# Patient Record
Sex: FEMALE | Race: WHITE | Employment: OTHER | URBAN - METROPOLITAN AREA
[De-identification: names, ages, dates, MRNs, and addresses within clinical notes are randomized per-mention and may not be internally consistent; named-entity substitution may affect disease eponyms.]

---

## 2017-06-02 ENCOUNTER — HOSPITAL ENCOUNTER (EMERGENCY)
Age: 82
Discharge: HOME | End: 2017-06-02
Attending: EMERGENCY MEDICINE
Payer: MEDICARE

## 2017-06-02 VITALS
TEMPERATURE: 96 F | BODY MASS INDEX: 16.1 KG/M2 | HEART RATE: 103 BPM | DIASTOLIC BLOOD PRESSURE: 72 MMHG | RESPIRATION RATE: 18 BRPM | HEIGHT: 60 IN | SYSTOLIC BLOOD PRESSURE: 117 MMHG | WEIGHT: 82 LBS | OXYGEN SATURATION: 96 %

## 2017-06-02 DIAGNOSIS — S91.051A CAT BITE OF ANKLE, RIGHT, INITIAL ENCOUNTER: Primary | ICD-10-CM
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
## 2017-06-02 PROCEDURE — 90471 IMMUNIZATION ADMIN: CPT | Performed by: EMERGENCY MEDICINE

## 2017-06-02 PROCEDURE — 6370000000 HC RX 637 (ALT 250 FOR IP): Performed by: EMERGENCY MEDICINE

## 2017-06-02 PROCEDURE — 90715 TDAP VACCINE 7 YRS/> IM: CPT | Performed by: EMERGENCY MEDICINE

## 2017-06-02 PROCEDURE — 99283 EMERGENCY DEPT VISIT LOW MDM: CPT

## 2017-06-02 PROCEDURE — 6360000002 HC RX W HCPCS: Performed by: EMERGENCY MEDICINE

## 2017-06-02 RX ORDER — BACITRACIN, NEOMYCIN, POLYMYXIN B 400; 3.5; 5 [USP'U]/G; MG/G; [USP'U]/G
OINTMENT TOPICAL
Qty: 10 G | Refills: 0 | Status: SHIPPED | OUTPATIENT
Start: 2017-06-02 | End: 2017-06-12

## 2017-06-02 RX ORDER — ONDANSETRON 4 MG/1
4 TABLET, FILM COATED ORAL EVERY 8 HOURS PRN
Qty: 20 TABLET | Refills: 0 | Status: ON HOLD | OUTPATIENT
Start: 2017-06-02 | End: 2017-10-10 | Stop reason: ALTCHOICE

## 2017-06-02 RX ORDER — CEPHALEXIN 500 MG/1
500 CAPSULE ORAL 3 TIMES DAILY
Qty: 21 CAPSULE | Refills: 0 | Status: ON HOLD | OUTPATIENT
Start: 2017-06-02 | End: 2017-10-13 | Stop reason: HOSPADM

## 2017-06-02 RX ORDER — AMOXICILLIN AND CLAVULANATE POTASSIUM 500; 125 MG/1; MG/1
1 TABLET, FILM COATED ORAL 3 TIMES DAILY
Qty: 30 TABLET | Refills: 0 | Status: SHIPPED | OUTPATIENT
Start: 2017-06-02 | End: 2017-06-02 | Stop reason: SDDI

## 2017-06-02 RX ORDER — AMOXICILLIN AND CLAVULANATE POTASSIUM 500; 125 MG/1; MG/1
1 TABLET, FILM COATED ORAL EVERY 8 HOURS SCHEDULED
Status: DISCONTINUED | OUTPATIENT
Start: 2017-06-02 | End: 2017-06-02 | Stop reason: HOSPADM

## 2017-06-02 RX ORDER — AMOXICILLIN AND CLAVULANATE POTASSIUM 250; 62.5 MG/5ML; MG/5ML
500 POWDER, FOR SUSPENSION ORAL ONCE
Status: DISCONTINUED | OUTPATIENT
Start: 2017-06-02 | End: 2017-06-02

## 2017-06-02 RX ADMIN — AMOXICILLIN AND CLAVULANATE POTASSIUM 1 TABLET: 500; 125 TABLET, FILM COATED ORAL at 12:49

## 2017-06-02 RX ADMIN — TETANUS TOXOID, REDUCED DIPHTHERIA TOXOID AND ACELLULAR PERTUSSIS VACCINE, ADSORBED 0.5 ML: 5; 2.5; 8; 8; 2.5 SUSPENSION INTRAMUSCULAR at 12:26

## 2017-06-02 ASSESSMENT — ENCOUNTER SYMPTOMS
ALLERGIC/IMMUNOLOGIC NEGATIVE: 1
RESPIRATORY NEGATIVE: 1
GASTROINTESTINAL NEGATIVE: 1
EYES NEGATIVE: 1

## 2017-06-02 ASSESSMENT — PAIN DESCRIPTION - DESCRIPTORS: DESCRIPTORS: TENDER

## 2017-06-02 ASSESSMENT — PAIN SCALES - GENERAL: PAINLEVEL_OUTOF10: 4

## 2017-06-02 ASSESSMENT — PAIN DESCRIPTION - LOCATION: LOCATION: ANKLE

## 2017-06-02 ASSESSMENT — PAIN DESCRIPTION - ORIENTATION: ORIENTATION: RIGHT

## 2017-06-02 ASSESSMENT — PAIN DESCRIPTION - PAIN TYPE: TYPE: ACUTE PAIN

## 2017-10-08 ENCOUNTER — APPOINTMENT (OUTPATIENT)
Dept: GENERAL RADIOLOGY | Age: 82
DRG: 189 | End: 2017-10-08
Payer: MEDICARE

## 2017-10-08 ENCOUNTER — HOSPITAL ENCOUNTER (INPATIENT)
Age: 82
LOS: 5 days | Discharge: NURSING, REHAB OR LONG-TERM CARE | DRG: 189 | End: 2017-10-13
Attending: EMERGENCY MEDICINE | Admitting: INTERNAL MEDICINE
Payer: MEDICARE

## 2017-10-08 ENCOUNTER — APPOINTMENT (OUTPATIENT)
Dept: CT IMAGING | Age: 82
DRG: 189 | End: 2017-10-08
Payer: MEDICARE

## 2017-10-08 DIAGNOSIS — R79.89 LFT ELEVATION: ICD-10-CM

## 2017-10-08 DIAGNOSIS — R74.8 CARDIAC ENZYMES ELEVATED: ICD-10-CM

## 2017-10-08 DIAGNOSIS — R55 SYNCOPE AND COLLAPSE: Primary | ICD-10-CM

## 2017-10-08 DIAGNOSIS — R41.0 DISORIENTATION: ICD-10-CM

## 2017-10-08 DIAGNOSIS — E87.20 LACTIC ACID INCREASED: ICD-10-CM

## 2017-10-08 PROBLEM — R06.89 CO2 NARCOSIS: Status: ACTIVE | Noted: 2017-10-08

## 2017-10-08 PROBLEM — J44.1 CHRONIC OBSTRUCTIVE PULMONARY DISEASE WITH ACUTE EXACERBATION (HCC): Status: ACTIVE | Noted: 2017-10-08

## 2017-10-08 PROBLEM — R77.8 ELEVATED TROPONIN: Status: ACTIVE | Noted: 2017-10-08

## 2017-10-08 PROBLEM — I95.9 HYPOTENSION: Status: ACTIVE | Noted: 2017-10-08

## 2017-10-08 PROBLEM — J96.22 ACUTE ON CHRONIC RESPIRATORY FAILURE WITH HYPERCAPNIA (HCC): Status: ACTIVE | Noted: 2017-10-08

## 2017-10-08 LAB
% CKMB: 12.2 % (ref 0–3)
% CKMB: 8.2 % (ref 0–3)
-: ABNORMAL
ABSOLUTE EOS #: 0 K/UL (ref 0–0.4)
ABSOLUTE LYMPH #: 0.5 K/UL (ref 1–4.8)
ABSOLUTE MONO #: 0.3 K/UL (ref 0.2–0.8)
ALBUMIN SERPL-MCNC: 3.3 G/DL (ref 3.5–5.2)
ALBUMIN/GLOBULIN RATIO: ABNORMAL (ref 1–2.5)
ALP BLD-CCNC: 92 U/L (ref 35–104)
ALT SERPL-CCNC: 503 U/L (ref 5–33)
AMMONIA: 29 UMOL/L (ref 11–51)
AMORPHOUS: ABNORMAL
AMYLASE: 90 U/L (ref 28–100)
ANION GAP SERPL CALCULATED.3IONS-SCNC: 15 MMOL/L (ref 9–17)
AST SERPL-CCNC: 549 U/L
BACTERIA: ABNORMAL
BASOPHILS # BLD: 0 %
BASOPHILS ABSOLUTE: 0 K/UL (ref 0–0.2)
BILIRUB SERPL-MCNC: 0.74 MG/DL (ref 0.3–1.2)
BILIRUBIN URINE: NEGATIVE
BNP INTERPRETATION: ABNORMAL
BUN BLDV-MCNC: 32 MG/DL (ref 8–23)
BUN/CREAT BLD: 21 (ref 9–20)
CALCIUM SERPL-MCNC: 9 MG/DL (ref 8.6–10.4)
CASTS UA: ABNORMAL /LPF
CHLORIDE BLD-SCNC: 100 MMOL/L (ref 98–107)
CK MB: 12.6 NG/ML
CK MB: 5 NG/ML
CKMB INTERPRETATION: ABNORMAL
CKMB INTERPRETATION: ABNORMAL
CO2: 28 MMOL/L (ref 20–31)
COLOR: YELLOW
COMMENT UA: ABNORMAL
CREAT SERPL-MCNC: 1.51 MG/DL (ref 0.5–0.9)
CRYSTALS, UA: ABNORMAL /HPF
DIFFERENTIAL TYPE: ABNORMAL
EOSINOPHILS RELATIVE PERCENT: 0 %
EPITHELIAL CELLS UA: ABNORMAL /HPF
FIO2: 2
GFR AFRICAN AMERICAN: 39 ML/MIN
GFR NON-AFRICAN AMERICAN: 32 ML/MIN
GFR SERPL CREATININE-BSD FRML MDRD: ABNORMAL ML/MIN/{1.73_M2}
GFR SERPL CREATININE-BSD FRML MDRD: ABNORMAL ML/MIN/{1.73_M2}
GLUCOSE BLD-MCNC: 166 MG/DL (ref 70–99)
GLUCOSE URINE: NEGATIVE
HCT VFR BLD CALC: 43.5 % (ref 36–46)
HEMOGLOBIN: 14.1 G/DL (ref 12–16)
KETONES, URINE: NEGATIVE
LACTIC ACID: 2.5 MMOL/L (ref 0.5–2.2)
LACTIC ACID: 2.9 MMOL/L (ref 0.5–2.2)
LEUKOCYTE ESTERASE, URINE: NEGATIVE
LIPASE: 58 U/L (ref 13–60)
LYMPHOCYTES # BLD: 4 %
MAGNESIUM: 2.7 MG/DL (ref 1.6–2.6)
MCH RBC QN AUTO: 30.7 PG (ref 26–34)
MCHC RBC AUTO-ENTMCNC: 32.4 G/DL (ref 31–37)
MCV RBC AUTO: 94.6 FL (ref 80–100)
MONOCYTES # BLD: 2 %
MUCUS: ABNORMAL
MYOGLOBIN: 278 NG/ML (ref 25–58)
MYOGLOBIN: 320 NG/ML (ref 25–58)
NEGATIVE BASE EXCESS, ART: ABNORMAL (ref 0–2)
NITRITE, URINE: NEGATIVE
O2 DEVICE/FLOW/%: ABNORMAL
OTHER OBSERVATIONS UA: ABNORMAL
PATIENT TEMP: ABNORMAL
PDW BLD-RTO: 14.1 % (ref 11.5–14.5)
PH UA: 6 (ref 5–8)
PLATELET # BLD: 227 K/UL (ref 130–400)
PLATELET ESTIMATE: ABNORMAL
PMV BLD AUTO: ABNORMAL FL (ref 6–12)
POC HCO3: 39 MMOL/L (ref 22–27)
POC O2 SATURATION: 98 %
POC PCO2 TEMP: ABNORMAL MM HG
POC PCO2: 94 MM HG (ref 32–45)
POC PH TEMP: ABNORMAL
POC PH: 7.23 (ref 7.35–7.45)
POC PO2 TEMP: ABNORMAL MM HG
POC PO2: 132 MM HG (ref 75–95)
POSITIVE BASE EXCESS, ART: 11 (ref 0–2)
POTASSIUM SERPL-SCNC: 5.2 MMOL/L (ref 3.7–5.3)
PRO-BNP: 1514 PG/ML
PROTEIN UA: ABNORMAL
RBC # BLD: 4.6 M/UL (ref 4–5.2)
RBC # BLD: ABNORMAL 10*6/UL
RBC UA: ABNORMAL /HPF (ref 0–2)
RENAL EPITHELIAL, UA: ABNORMAL /HPF
SEG NEUTROPHILS: 94 %
SEGMENTED NEUTROPHILS ABSOLUTE COUNT: 11.6 K/UL (ref 1.8–7.7)
SODIUM BLD-SCNC: 143 MMOL/L (ref 135–144)
SPECIFIC GRAVITY UA: 1.02 (ref 1–1.03)
TCO2 (CALC), ART: 42 MMOL/L (ref 23–28)
TOTAL CK: 103 U/L (ref 26–192)
TOTAL CK: 61 U/L (ref 26–192)
TOTAL PROTEIN: 6.7 G/DL (ref 6.4–8.3)
TRICHOMONAS: ABNORMAL
TROPONIN INTERP: ABNORMAL
TROPONIN INTERP: ABNORMAL
TROPONIN T: 0.07 NG/ML
TROPONIN T: 0.18 NG/ML
TURBIDITY: CLEAR
URINE HGB: NEGATIVE
UROBILINOGEN, URINE: NORMAL
WBC # BLD: 12.4 K/UL (ref 3.5–11)
WBC # BLD: ABNORMAL 10*3/UL
WBC UA: ABNORMAL /HPF (ref 0–5)
YEAST: ABNORMAL

## 2017-10-08 PROCEDURE — 93005 ELECTROCARDIOGRAM TRACING: CPT

## 2017-10-08 PROCEDURE — 51701 INSERT BLADDER CATHETER: CPT

## 2017-10-08 PROCEDURE — 83690 ASSAY OF LIPASE: CPT

## 2017-10-08 PROCEDURE — 36600 WITHDRAWAL OF ARTERIAL BLOOD: CPT

## 2017-10-08 PROCEDURE — 85025 COMPLETE CBC W/AUTO DIFF WBC: CPT

## 2017-10-08 PROCEDURE — 96361 HYDRATE IV INFUSION ADD-ON: CPT

## 2017-10-08 PROCEDURE — 83735 ASSAY OF MAGNESIUM: CPT

## 2017-10-08 PROCEDURE — 96360 HYDRATION IV INFUSION INIT: CPT

## 2017-10-08 PROCEDURE — 82150 ASSAY OF AMYLASE: CPT

## 2017-10-08 PROCEDURE — 72170 X-RAY EXAM OF PELVIS: CPT

## 2017-10-08 PROCEDURE — 2060000000 HC ICU INTERMEDIATE R&B

## 2017-10-08 PROCEDURE — 83605 ASSAY OF LACTIC ACID: CPT

## 2017-10-08 PROCEDURE — 96374 THER/PROPH/DIAG INJ IV PUSH: CPT

## 2017-10-08 PROCEDURE — 99285 EMERGENCY DEPT VISIT HI MDM: CPT

## 2017-10-08 PROCEDURE — 94150 VITAL CAPACITY TEST: CPT

## 2017-10-08 PROCEDURE — 80053 COMPREHEN METABOLIC PANEL: CPT

## 2017-10-08 PROCEDURE — 84484 ASSAY OF TROPONIN QUANT: CPT

## 2017-10-08 PROCEDURE — 94640 AIRWAY INHALATION TREATMENT: CPT

## 2017-10-08 PROCEDURE — 70450 CT HEAD/BRAIN W/O DYE: CPT

## 2017-10-08 PROCEDURE — 81001 URINALYSIS AUTO W/SCOPE: CPT

## 2017-10-08 PROCEDURE — 83880 ASSAY OF NATRIURETIC PEPTIDE: CPT

## 2017-10-08 PROCEDURE — 82553 CREATINE MB FRACTION: CPT

## 2017-10-08 PROCEDURE — 94761 N-INVAS EAR/PLS OXIMETRY MLT: CPT

## 2017-10-08 PROCEDURE — 2580000003 HC RX 258: Performed by: NURSE PRACTITIONER

## 2017-10-08 PROCEDURE — 6360000002 HC RX W HCPCS: Performed by: NURSE PRACTITIONER

## 2017-10-08 PROCEDURE — 82550 ASSAY OF CK (CPK): CPT

## 2017-10-08 PROCEDURE — 82803 BLOOD GASES ANY COMBINATION: CPT

## 2017-10-08 PROCEDURE — 83874 ASSAY OF MYOGLOBIN: CPT

## 2017-10-08 PROCEDURE — 36415 COLL VENOUS BLD VENIPUNCTURE: CPT

## 2017-10-08 PROCEDURE — 87040 BLOOD CULTURE FOR BACTERIA: CPT

## 2017-10-08 PROCEDURE — 2700000000 HC OXYGEN THERAPY PER DAY

## 2017-10-08 PROCEDURE — 71010 XR CHEST PORTABLE: CPT

## 2017-10-08 PROCEDURE — 72125 CT NECK SPINE W/O DYE: CPT

## 2017-10-08 PROCEDURE — 87086 URINE CULTURE/COLONY COUNT: CPT

## 2017-10-08 PROCEDURE — 82140 ASSAY OF AMMONIA: CPT

## 2017-10-08 PROCEDURE — 74176 CT ABD & PELVIS W/O CONTRAST: CPT

## 2017-10-08 PROCEDURE — 6370000000 HC RX 637 (ALT 250 FOR IP): Performed by: EMERGENCY MEDICINE

## 2017-10-08 RX ORDER — ALBUTEROL SULFATE 2.5 MG/3ML
5 SOLUTION RESPIRATORY (INHALATION)
Status: DISCONTINUED | OUTPATIENT
Start: 2017-10-08 | End: 2017-10-09

## 2017-10-08 RX ORDER — 0.9 % SODIUM CHLORIDE 0.9 %
500 INTRAVENOUS SOLUTION INTRAVENOUS ONCE
Status: COMPLETED | OUTPATIENT
Start: 2017-10-08 | End: 2017-10-08

## 2017-10-08 RX ORDER — ALBUTEROL SULFATE 90 UG/1
2 AEROSOL, METERED RESPIRATORY (INHALATION)
Status: DISCONTINUED | OUTPATIENT
Start: 2017-10-08 | End: 2017-10-09

## 2017-10-08 RX ORDER — ASPIRIN 81 MG/1
324 TABLET, CHEWABLE ORAL ONCE
Status: COMPLETED | OUTPATIENT
Start: 2017-10-08 | End: 2017-10-08

## 2017-10-08 RX ORDER — SODIUM CHLORIDE 9 MG/ML
INJECTION, SOLUTION INTRAVENOUS CONTINUOUS
Status: DISCONTINUED | OUTPATIENT
Start: 2017-10-08 | End: 2017-10-09 | Stop reason: SDUPTHER

## 2017-10-08 RX ORDER — METHYLPREDNISOLONE SODIUM SUCCINATE 125 MG/2ML
125 INJECTION, POWDER, LYOPHILIZED, FOR SOLUTION INTRAMUSCULAR; INTRAVENOUS ONCE
Status: COMPLETED | OUTPATIENT
Start: 2017-10-08 | End: 2017-10-08

## 2017-10-08 RX ORDER — IPRATROPIUM BROMIDE AND ALBUTEROL SULFATE 2.5; .5 MG/3ML; MG/3ML
1 SOLUTION RESPIRATORY (INHALATION)
Status: DISCONTINUED | OUTPATIENT
Start: 2017-10-08 | End: 2017-10-09

## 2017-10-08 RX ORDER — SPIRONOLACTONE 50 MG/1
TABLET, FILM COATED ORAL DAILY
Status: ON HOLD | COMMUNITY
End: 2017-10-10 | Stop reason: ALTCHOICE

## 2017-10-08 RX ADMIN — SODIUM CHLORIDE 500 ML: 0.9 INJECTION, SOLUTION INTRAVENOUS at 19:13

## 2017-10-08 RX ADMIN — ASPIRIN 81 MG 324 MG: 81 TABLET ORAL at 22:38

## 2017-10-08 RX ADMIN — METHYLPREDNISOLONE SODIUM SUCCINATE 125 MG: 125 INJECTION, POWDER, FOR SOLUTION INTRAMUSCULAR; INTRAVENOUS at 19:17

## 2017-10-08 RX ADMIN — ALBUTEROL SULFATE 5 MG: 5 SOLUTION RESPIRATORY (INHALATION) at 18:55

## 2017-10-08 RX ADMIN — SODIUM CHLORIDE: 9 INJECTION, SOLUTION INTRAVENOUS at 20:01

## 2017-10-08 ASSESSMENT — ENCOUNTER SYMPTOMS
COUGH: 0
COLOR CHANGE: 0
SHORTNESS OF BREATH: 0
NAUSEA: 0
ABDOMINAL PAIN: 0
VOMITING: 0

## 2017-10-08 NOTE — ED PROVIDER NOTES
[address] [place] [address] ED  eMERGENCY dEPARTMENT eNCOUnter      Pt Name: Vidal Dong  MRN: [id number]  [place] 11/10/1925  Date of evaluation: 10/8/2017  Provider: Ashutosh Bustillos NP    [address] [place] [address]       Chief Complaint   Patient presents with    Loss of Consciousness         HISTORY OF PRESENT ILLNESS  (Location/Symptom, Timing/Onset, Context/Setting, Quality, Duration, Modifying Factors, Severity.)   Vidal Dong is a 80 y.o. female who presents to the emergency department By [facility] for evaluation of loss of consciousness. Daughter states patient was found lying on May the neuro floor by the patient's spouse. The patient's spouse had walked outside for a little while when he came back patient was found on the floor. She was unconscious. It is unknown how long the patient was down on the floor. When EMS arrived patient was breathing shallow. She was started on IV fluids and was arousable. Upon arrival to the ED, the patient is alert, oriented and appropriate. She denies pain. No nausea or vomiting. She is able to move all extremities to command without difficulty. No focal weakness. Daughter states patient has had poor appetite and has had increased generalized weakness. Nursing Notes were reviewed.     PAST MEDICAL HISTORY     Past Medical History:   Diagnosis Date    CAD (coronary artery disease)     [facility]     Colon polyp     COPD [facility] obstructive [facility]     Hypertension     Hyperthyroidism     Pelvic fracture (HCC)     Unspecified cerebral artery occlusion with cerebral infarction          SURGICAL HISTORY       Past Surgical History:   Procedure Laterality Date    BREAST SURGERY      COLONOSCOPY      CORONARY ANGIOPLASTY WITH STENT PLACEMENT  2010    HYSTERECTOMY      JOINT REPLACEMENT      THYROIDECTOMY N/A     partial     VASCULAR SURGERY           CURRENT MEDICATIONS       Previous Medications    ALBUTEROL (ACCUNEB) 0.63 MG/3ML NEBULIZER SOLUTION    Take 1 ampule Abdominal: Soft. Normal appearance and bowel sounds are normal. There is no tenderness. There is no rigidity, no rebound and no guarding. No hernia. Musculoskeletal: Normal range of motion. Neurological: She is alert and oriented to person, place, and time. She has normal strength and normal reflexes. No cranial nerve deficit or sensory deficit. Skin: Skin is warm, dry and intact. No laceration noted. No erythema. There is slight mottling noted to the patient's lower extremities. Psychiatric: She has a normal mood and affect. Her speech is normal and behavior is normal. Judgment and thought content normal. Cognition and memory are normal.         DIAGNOSTIC RESULTS     EKG: All EKG's are interpreted by the Emergency Department Physician who either signs or Co-signs this chart in the absence of a cardiologist.    EKG interpreted by attending physician    RADIOLOGY:   Non-plain film images such as CT, Ultrasound and MRI are read by the radiologist. Jettie Carwin radiographic images are visualized and preliminarily interpreted by the emergency physician with the below findings:    Ct Abdomen Pelvis Wo Iv Contrast    Result Date: 10/8/2017  EXAMINATION: CT OF THE ABDOMEN AND PELVIS WITHOUT CONTRAST 10/8/2017 8:44 pm TECHNIQUE: CT of the abdomen and pelvis was performed without the administration of intravenous contrast. Multiplanar reformatted images are provided for review. Dose modulation, iterative reconstruction, and/or weight based adjustment of the mA/kV was utilized to reduce the radiation dose to as low as reasonably achievable. COMPARISON: September 26, 2015 HISTORY: ORDERING SYSTEM PROVIDED HISTORY: loc, elev lactic, abd pain FINDINGS: Limited study due to lack of IV contrast and paucity of intra-abdominal fat with crowding of soft tissue and bowel structures. Lower Chest:  Mild bronchiectasis with bronchial wall thickening at the bases, some with mucus impaction.   Additional scattered reticular opacities age-indeterminate mild compression deformity of the T12 vertebral body, though new from September 26, 2015. 8. Chronic compression deformity of the L1 vertebral body with vertebroplasty cement material in place and mild increased retropulsion. 9. No definite pelvic or hip fracture identified. 10. Sigmoid diverticulosis. 11. Trace right pleural effusion. Mild bronchiectasis and bronchial wall thickening in bilateral lower lobes, some with mucus impaction. Findings could represent aspiration or infectious bronchiolitis. Xr Pelvis (1-2 Views)    Result Date: 10/8/2017  EXAMINATION: SINGLE VIEW OF THE PELVIS 10/8/2017 7:40 pm COMPARISON: August 31, 2012 HISTORY: ORDERING SYSTEM PROVIDED HISTORY: portable film, found down TECHNOLOGIST PROVIDED HISTORY: Reason for exam:->portable film, found down Ordering Physician Provided Reason for Exam: syncope Acuity: Acute Type of Exam: Initial FINDINGS: Diffuse osteopenia limits evaluation for subtle fractures. Chronic fracture deformities of bilateral inferior pubic rami. No definite acute fracture identified. Stable appearance of the left proximal femur fixation hardware. Leftward curvature of the lumbar spine with associated degenerative changes noted. No definite acute soft tissue abnormality. Limited study due to diffuse osteopenia. No definite acute fracture or subluxation identified. Chronic fracture deformities of bilateral inferior pubic rami. Ct Head Wo Contrast    Result Date: 10/8/2017  EXAMINATION: CT OF THE HEAD WITHOUT CONTRAST  10/8/2017 7:38 pm TECHNIQUE: CT of the head was performed without the administration of intravenous contrast. Dose modulation, iterative reconstruction, and/or weight based adjustment of the mA/kV was utilized to reduce the radiation dose to as low as reasonably achievable. COMPARISON: September 25, 2015.  HISTORY: ORDERING SYSTEM PROVIDED HISTORY: Fall TECHNOLOGIST PROVIDED HISTORY: Has a \"code stroke\" or \"stroke alert\" flexion-extension views may be helpful. Xr Chest Portable    Result Date: 10/8/2017  EXAMINATION: SINGLE VIEW OF THE CHEST 10/8/2017 7:21 pm COMPARISON: December 22, 2014 HISTORY: ORDERING SYSTEM PROVIDED HISTORY: SOB TECHNOLOGIST PROVIDED HISTORY: Reason for exam:->SOB Ordering Physician Provided Reason for Exam: loss of consciousness Acuity: Unknown Type of Exam: Unknown Relevant Medical/Surgical History: pt was found unconscious, hx of copd FINDINGS: Single portable frontal projection of the chest with obscuration of the left greater than right apices. Emphysema with fibrotic changes again noted, including fibronodular scarring in the right apex. No definite pneumothorax. No sizable pleural effusions. No new or focal lung opacity or consolidation. Calcified granulomas are present. The cardiomediastinal silhouette is unchanged. Surgical clips are noted in the right chest/axillary region. There is diffuse osteopenia. There is vertebroplasty cement material within the L1 vertebral body. No definite significant change compared to prior study. Redemonstration of emphysema with fibrotic changes, including fibronodular scarring in the right apex. No acute consolidation. Interpretation per the Radiologist below, if available at the time of this note:    CT ABDOMEN PELVIS WO IV CONTRAST   Preliminary Result   1. Limited examination due to lack of IV contrast and paucity of   intra-abdominal fat, with crowding of bowel and organs. 2. No definite acute abnormality detected. 3. Nonspecific small fluid within the lower pelvis. 4. Garza catheter in place. 5. Distended stomach, of uncertain etiology or clinical significance. 6. Nonobstructing bilateral punctate renal calculi. 7. Diffuse osteopenia with age-indeterminate mild compression deformity of   the T12 vertebral body, though new from September 26, 2015.    8. Chronic compression deformity of the L1 vertebral body with vertebroplasty (*)     Segs Absolute 11.60 (*)     Absolute Lymph # 0.50 (*)     All other components within normal limits   TROP/MYOGLOBIN - Abnormal; Notable for the following:     Troponin T 0.07 (*)     Myoglobin 320 (*)     All other components within normal limits   TROP/MYOGLOBIN - Abnormal; Notable for the following:     Troponin T 0.18 (*)     Myoglobin 278 (*)     All other components within normal limits   LACTIC ACID - Abnormal; Notable for the following:     Lactic Acid 2.9 (*)     All other components within normal limits   LACTIC ACID - Abnormal; Notable for the following:     Lactic Acid 2.5 (*)     All other components within normal limits   MAGNESIUM - Abnormal; Notable for the following:     Magnesium 2.7 (*)     All other components within normal limits   CK ISOENZYMES - Abnormal; Notable for the following:     % CKMB 8.2 (*)     All other components within normal limits   CK ISOENZYMES - Abnormal; Notable for the following:     CK-MB 12.6 (*)     % CKMB 12.2 (*)     All other components within normal limits   BRAIN NATRIURETIC PEPTIDE - Abnormal; Notable for the following:     Pro-BNP 1,514 (*)     All other components within normal limits   MICROSCOPIC URINALYSIS - Abnormal; Notable for the following:     Bacteria, UA FEW (*)     Mucus, UA 1+ (*)     All other components within normal limits   POC PANEL (G3)-ART - Abnormal; Notable for the following:     POC pH 7.23 (*)     POC pCO2 94 (*)     POC PO2 132 (*)     TCO2 (calc), Art 42 (*)     POC HCO3 39.0 (*)     Positive Base Excess, Art 11 (*)     All other components within normal limits   CULTURE BLOOD #1   CULTURE BLOOD #1   CULTURE, URINE CATHETER   LIPASE   AMYLASE   AMMONIA   TROP/MYOGLOBIN   CK ISOENZYMES       All other labs were within normal range or not returned as of this dictation. EMERGENCY DEPARTMENT COURSE and DIFFERENTIAL DIAGNOSIS/MDM:   Patient was evaluated in conjunction with Dr. Wilda Johnson. Labs, CTs and x-ray results reviewed.   Patient was given aspirin, IV steroids, breathing treatment and IV fluids in the ED. She was also placed on BiPAP. She'll be admitted. Vitals:    Vitals:    10/08/17 2200 10/08/17 2215 10/08/17 2230 10/08/17 2242   BP: (!) 86/49 (!) 85/50 (!) 99/50    Pulse: 99 98 100    Resp: 18 18 16 19   Temp:       TempSrc:       SpO2: 100% 100% 100% 100%   Weight:       Height:             CONSULTS:  IP CONSULT TO INTERNAL MEDICINE    PROCEDURES:  Procedures    FINAL IMPRESSION      1. Syncope and collapse    2. Lactic acid increased    3. LFT elevation    4. Disorientation    5. Cardiac enzymes elevated          DISPOSITION/PLAN   DISPOSITION Decision to Admit    PATIENT REFERRED TO:   No follow-up provider specified.     DISCHARGE MEDICATIONS:     New Prescriptions    No medications on file     Electronically signed by Panfilo Hanks NP on 10/8/2017 at 11:09 PM            Panfilo Hanks NP  10/08/17 [id number]

## 2017-10-08 NOTE — PROGRESS NOTES
· Bronchodilator assessment   [x]    Bronchodilator Assessment    FEV1 % PREDICTED NA  FEV1 actual: NA  PEFR  NA  PEFR % Predicted NA  RR 20  Bronchodilator assessment at level  3   Pt unable to do spirometry due to weakness. BRONCHODILATOR ASSESSMENT SCORE  Score 1 2 3 4   Breath Sounds   []  Clear []  Mild Wheezing with good aeration []  Moderate I/E wheezing with adequate aeration [x]  Poor Aeration or diffuse wheezing   Respiratory Rate []  Less than 20 [x]  20-25 []  Greater than 25  []  Greater than 35    Dyspnea [x]  No SOB  []  SOB with minimal activity []  Speaking in partial sentences []  Acute/ At rest   Peakflow (asthma) []  80 % or greater predicted/PB  []  Unable []  70% or greater predicted/PB  []  Unable []  51%-70% predicted/PB  [x]  Unable []  Less than 50% predicted/PB  []  Unable due to distress   FEV1 % Predicted []  Greater than 69%  []  Unable  []  Less than 50%-69%  []  Unable  []  Less than 35%-49%  [x]  Unable  []  Less than 35%  []  Unable due to distress     · Bronchodilator assessment   [x]    Bronchodilator Assessment    FEV1 % PREDICTED NA  FEV1 actual: na  PEFR  na  PEFR % Predicted na  RR 15  Bronchodilator assessment at level 2    Will follow up with pt. TAL SPANGLER informed that pt only given 5mg. Vesicular bilaterally in the apices and clear/dim bibasilarly when pt takes a deep breath. Pt is not under distress.   BRONCHODILATOR ASSESSMENT SCORE  Score 1 2 3 4   Breath Sounds   [x]  Clear []  Mild Wheezing with good aeration []  Moderate I/E wheezing with adequate aeration [x]  Poor Aeration or diffuse wheezing   Respiratory Rate [x]  Less than 20 []  20-25 []  Greater than 25  []  Greater than 35    Dyspnea [x]  No SOB  []  SOB with minimal activity []  Speaking in partial sentences []  Acute/ At rest   Peakflow (asthma) []  80 % or greater predicted/PB  []  Unable []  70% or greater predicted/PB  [x]  Unable []  51%-70% predicted/PB  []  Unable []  Less than 50%

## 2017-10-09 ENCOUNTER — APPOINTMENT (OUTPATIENT)
Dept: GENERAL RADIOLOGY | Age: 82
DRG: 189 | End: 2017-10-09
Payer: MEDICARE

## 2017-10-09 PROBLEM — I27.20 PULMONARY HYPERTENSION (HCC): Status: ACTIVE | Noted: 2017-10-09

## 2017-10-09 PROBLEM — I36.1 NON-RHEUMATIC TRICUSPID VALVE INSUFFICIENCY: Status: ACTIVE | Noted: 2017-10-09

## 2017-10-09 PROBLEM — E46 MALNUTRITION (HCC): Status: ACTIVE | Noted: 2017-10-09

## 2017-10-09 LAB
% CKMB: 11.8 % (ref 0–3)
ABSOLUTE EOS #: 0 K/UL (ref 0–0.4)
ABSOLUTE LYMPH #: 0.3 K/UL (ref 1–4.8)
ABSOLUTE MONO #: 0.2 K/UL (ref 0.2–0.8)
ANION GAP SERPL CALCULATED.3IONS-SCNC: 10 MMOL/L (ref 9–17)
BASOPHILS # BLD: 0 %
BASOPHILS ABSOLUTE: 0 K/UL (ref 0–0.2)
BNP INTERPRETATION: ABNORMAL
BUN BLDV-MCNC: 40 MG/DL (ref 8–23)
BUN/CREAT BLD: 24 (ref 9–20)
CALCIUM SERPL-MCNC: 8.5 MG/DL (ref 8.6–10.4)
CHLORIDE BLD-SCNC: 105 MMOL/L (ref 98–107)
CK MB: 16.9 NG/ML
CKMB INTERPRETATION: ABNORMAL
CO2: 30 MMOL/L (ref 20–31)
CREAT SERPL-MCNC: 1.66 MG/DL (ref 0.5–0.9)
CULTURE: NO GROWTH
CULTURE: NORMAL
DIFFERENTIAL TYPE: ABNORMAL
EKG ATRIAL RATE: 106 BPM
EKG P AXIS: 85 DEGREES
EKG P-R INTERVAL: 148 MS
EKG Q-T INTERVAL: 360 MS
EKG QRS DURATION: 76 MS
EKG QTC CALCULATION (BAZETT): 478 MS
EKG R AXIS: 88 DEGREES
EKG T AXIS: 53 DEGREES
EKG VENTRICULAR RATE: 106 BPM
EOSINOPHILS RELATIVE PERCENT: 0 %
FIO2: 24
GFR AFRICAN AMERICAN: 35 ML/MIN
GFR NON-AFRICAN AMERICAN: 29 ML/MIN
GFR SERPL CREATININE-BSD FRML MDRD: ABNORMAL ML/MIN/{1.73_M2}
GFR SERPL CREATININE-BSD FRML MDRD: ABNORMAL ML/MIN/{1.73_M2}
GLUCOSE BLD-MCNC: 174 MG/DL (ref 65–105)
GLUCOSE BLD-MCNC: 198 MG/DL (ref 65–105)
GLUCOSE BLD-MCNC: 200 MG/DL (ref 70–99)
GLUCOSE BLD-MCNC: 211 MG/DL (ref 65–105)
HCT VFR BLD CALC: 39.9 % (ref 36–46)
HEMOGLOBIN: 12.9 G/DL (ref 12–16)
LACTIC ACID: 1.4 MMOL/L (ref 0.5–2.2)
LACTIC ACID: 3.1 MMOL/L (ref 0.5–2.2)
LACTIC ACID: 3.9 MMOL/L (ref 0.5–2.2)
LV EF: 65 %
LVEF MODALITY: NORMAL
LYMPHOCYTES # BLD: 2 %
Lab: NORMAL
MCH RBC QN AUTO: 31.1 PG (ref 26–34)
MCHC RBC AUTO-ENTMCNC: 32.3 G/DL (ref 31–37)
MCV RBC AUTO: 96.1 FL (ref 80–100)
MONOCYTES # BLD: 2 %
MYOGLOBIN: 231 NG/ML (ref 25–58)
NEGATIVE BASE EXCESS, ART: ABNORMAL (ref 0–2)
O2 DEVICE/FLOW/%: ABNORMAL
PATIENT TEMP: ABNORMAL
PDW BLD-RTO: 14.1 % (ref 11.5–14.5)
PLATELET # BLD: 234 K/UL (ref 130–400)
PLATELET ESTIMATE: ABNORMAL
PMV BLD AUTO: 8.7 FL (ref 6–12)
POC HCO3: 28 MMOL/L (ref 22–27)
POC O2 SATURATION: 91 %
POC PCO2 TEMP: ABNORMAL MM HG
POC PCO2: 59 MM HG (ref 32–45)
POC PH TEMP: ABNORMAL
POC PH: 7.29 (ref 7.35–7.45)
POC PO2 TEMP: ABNORMAL MM HG
POC PO2: 71 MM HG (ref 75–95)
POSITIVE BASE EXCESS, ART: 1 (ref 0–2)
POTASSIUM SERPL-SCNC: 5.4 MMOL/L (ref 3.7–5.3)
PRO-BNP: 5225 PG/ML
RBC # BLD: 4.15 M/UL (ref 4–5.2)
RBC # BLD: ABNORMAL 10*6/UL
SEG NEUTROPHILS: 96 %
SEGMENTED NEUTROPHILS ABSOLUTE COUNT: 12.5 K/UL (ref 1.8–7.7)
SODIUM BLD-SCNC: 145 MMOL/L (ref 135–144)
SPECIMEN DESCRIPTION: NORMAL
SPECIMEN DESCRIPTION: NORMAL
STATUS: NORMAL
TCO2 (CALC), ART: 30 MMOL/L (ref 23–28)
TOTAL CK: 143 U/L (ref 26–192)
TROPONIN INTERP: ABNORMAL
TROPONIN INTERP: ABNORMAL
TROPONIN T: 0.31 NG/ML
TROPONIN T: 0.48 NG/ML
WBC # BLD: 13 K/UL (ref 3.5–11)
WBC # BLD: ABNORMAL 10*3/UL

## 2017-10-09 PROCEDURE — 94640 AIRWAY INHALATION TREATMENT: CPT

## 2017-10-09 PROCEDURE — 36415 COLL VENOUS BLD VENIPUNCTURE: CPT

## 2017-10-09 PROCEDURE — 97530 THERAPEUTIC ACTIVITIES: CPT

## 2017-10-09 PROCEDURE — 82803 BLOOD GASES ANY COMBINATION: CPT

## 2017-10-09 PROCEDURE — 82553 CREATINE MB FRACTION: CPT

## 2017-10-09 PROCEDURE — G8988 SELF CARE GOAL STATUS: HCPCS

## 2017-10-09 PROCEDURE — 2580000003 HC RX 258: Performed by: INTERNAL MEDICINE

## 2017-10-09 PROCEDURE — 84484 ASSAY OF TROPONIN QUANT: CPT

## 2017-10-09 PROCEDURE — 6360000002 HC RX W HCPCS: Performed by: INTERNAL MEDICINE

## 2017-10-09 PROCEDURE — 2000000000 HC ICU R&B

## 2017-10-09 PROCEDURE — 2700000000 HC OXYGEN THERAPY PER DAY

## 2017-10-09 PROCEDURE — 85025 COMPLETE CBC W/AUTO DIFF WBC: CPT

## 2017-10-09 PROCEDURE — 94761 N-INVAS EAR/PLS OXIMETRY MLT: CPT

## 2017-10-09 PROCEDURE — 71010 XR CHEST PORTABLE: CPT

## 2017-10-09 PROCEDURE — G8987 SELF CARE CURRENT STATUS: HCPCS

## 2017-10-09 PROCEDURE — 94660 CPAP INITIATION&MGMT: CPT

## 2017-10-09 PROCEDURE — G8979 MOBILITY GOAL STATUS: HCPCS

## 2017-10-09 PROCEDURE — 6370000000 HC RX 637 (ALT 250 FOR IP): Performed by: INTERNAL MEDICINE

## 2017-10-09 PROCEDURE — 83874 ASSAY OF MYOGLOBIN: CPT

## 2017-10-09 PROCEDURE — 80048 BASIC METABOLIC PNL TOTAL CA: CPT

## 2017-10-09 PROCEDURE — 94664 DEMO&/EVAL PT USE INHALER: CPT

## 2017-10-09 PROCEDURE — 97166 OT EVAL MOD COMPLEX 45 MIN: CPT

## 2017-10-09 PROCEDURE — G8978 MOBILITY CURRENT STATUS: HCPCS

## 2017-10-09 PROCEDURE — 93306 TTE W/DOPPLER COMPLETE: CPT

## 2017-10-09 PROCEDURE — 97161 PT EVAL LOW COMPLEX 20 MIN: CPT

## 2017-10-09 PROCEDURE — 82550 ASSAY OF CK (CPK): CPT

## 2017-10-09 PROCEDURE — 83605 ASSAY OF LACTIC ACID: CPT

## 2017-10-09 PROCEDURE — 2500000003 HC RX 250 WO HCPCS: Performed by: INTERNAL MEDICINE

## 2017-10-09 PROCEDURE — 83880 ASSAY OF NATRIURETIC PEPTIDE: CPT

## 2017-10-09 PROCEDURE — 97535 SELF CARE MNGMENT TRAINING: CPT

## 2017-10-09 PROCEDURE — 82947 ASSAY GLUCOSE BLOOD QUANT: CPT

## 2017-10-09 RX ORDER — IPRATROPIUM BROMIDE AND ALBUTEROL SULFATE 2.5; .5 MG/3ML; MG/3ML
1 SOLUTION RESPIRATORY (INHALATION)
Status: DISCONTINUED | OUTPATIENT
Start: 2017-10-09 | End: 2017-10-13 | Stop reason: HOSPADM

## 2017-10-09 RX ORDER — HEPARIN SODIUM 5000 [USP'U]/ML
5000 INJECTION, SOLUTION INTRAVENOUS; SUBCUTANEOUS 2 TIMES DAILY
Status: DISCONTINUED | OUTPATIENT
Start: 2017-10-09 | End: 2017-10-13 | Stop reason: HOSPADM

## 2017-10-09 RX ORDER — M-VIT,TX,IRON,MINS/CALC/FOLIC 27MG-0.4MG
1 TABLET ORAL DAILY
Status: DISCONTINUED | OUTPATIENT
Start: 2017-10-09 | End: 2017-10-13 | Stop reason: HOSPADM

## 2017-10-09 RX ORDER — ONDANSETRON 4 MG/1
4 TABLET, FILM COATED ORAL EVERY 8 HOURS PRN
Status: DISCONTINUED | OUTPATIENT
Start: 2017-10-09 | End: 2017-10-13 | Stop reason: HOSPADM

## 2017-10-09 RX ORDER — LEVOFLOXACIN 5 MG/ML
500 INJECTION, SOLUTION INTRAVENOUS EVERY 24 HOURS
Status: DISCONTINUED | OUTPATIENT
Start: 2017-10-09 | End: 2017-10-09 | Stop reason: DRUGHIGH

## 2017-10-09 RX ORDER — NICOTINE POLACRILEX 4 MG
15 LOZENGE BUCCAL PRN
Status: DISCONTINUED | OUTPATIENT
Start: 2017-10-09 | End: 2017-10-13 | Stop reason: HOSPADM

## 2017-10-09 RX ORDER — DEXTROSE MONOHYDRATE 50 MG/ML
100 INJECTION, SOLUTION INTRAVENOUS PRN
Status: DISCONTINUED | OUTPATIENT
Start: 2017-10-09 | End: 2017-10-13 | Stop reason: HOSPADM

## 2017-10-09 RX ORDER — LEVOFLOXACIN 5 MG/ML
500 INJECTION, SOLUTION INTRAVENOUS ONCE
Status: COMPLETED | OUTPATIENT
Start: 2017-10-09 | End: 2017-10-09

## 2017-10-09 RX ORDER — B-COMPLEX WITH VITAMIN C
1 TABLET ORAL DAILY
Status: DISCONTINUED | OUTPATIENT
Start: 2017-10-09 | End: 2017-10-09 | Stop reason: CLARIF

## 2017-10-09 RX ORDER — METHYLPREDNISOLONE SODIUM SUCCINATE 40 MG/ML
40 INJECTION, POWDER, LYOPHILIZED, FOR SOLUTION INTRAMUSCULAR; INTRAVENOUS EVERY 6 HOURS
Status: DISCONTINUED | OUTPATIENT
Start: 2017-10-09 | End: 2017-10-10

## 2017-10-09 RX ORDER — ASPIRIN 81 MG/1
81 TABLET ORAL DAILY
Status: DISCONTINUED | OUTPATIENT
Start: 2017-10-09 | End: 2017-10-13 | Stop reason: HOSPADM

## 2017-10-09 RX ORDER — LEVOFLOXACIN 5 MG/ML
250 INJECTION, SOLUTION INTRAVENOUS
Status: DISCONTINUED | OUTPATIENT
Start: 2017-10-11 | End: 2017-10-11

## 2017-10-09 RX ORDER — DEXTROSE MONOHYDRATE 25 G/50ML
12.5 INJECTION, SOLUTION INTRAVENOUS PRN
Status: DISCONTINUED | OUTPATIENT
Start: 2017-10-09 | End: 2017-10-13 | Stop reason: HOSPADM

## 2017-10-09 RX ORDER — ALBUTEROL SULFATE 2.5 MG/3ML
2.5 SOLUTION RESPIRATORY (INHALATION) 4 TIMES DAILY
Status: DISCONTINUED | OUTPATIENT
Start: 2017-10-09 | End: 2017-10-09

## 2017-10-09 RX ORDER — SODIUM CHLORIDE 9 MG/ML
INJECTION, SOLUTION INTRAVENOUS CONTINUOUS
Status: DISCONTINUED | OUTPATIENT
Start: 2017-10-09 | End: 2017-10-12

## 2017-10-09 RX ORDER — CALCIUM CARBONATE/VITAMIN D3 600 MG-10
1 TABLET ORAL DAILY
Status: DISCONTINUED | OUTPATIENT
Start: 2017-10-09 | End: 2017-10-13 | Stop reason: HOSPADM

## 2017-10-09 RX ORDER — SODIUM CHLORIDE 9 MG/ML
INJECTION, SOLUTION INTRAVENOUS CONTINUOUS
Status: DISCONTINUED | OUTPATIENT
Start: 2017-10-09 | End: 2017-10-09

## 2017-10-09 RX ORDER — ALBUTEROL SULFATE 2.5 MG/3ML
2.5 SOLUTION RESPIRATORY (INHALATION)
Status: DISCONTINUED | OUTPATIENT
Start: 2017-10-09 | End: 2017-10-13 | Stop reason: HOSPADM

## 2017-10-09 RX ORDER — DOCUSATE SODIUM 100 MG/1
100 CAPSULE, LIQUID FILLED ORAL 2 TIMES DAILY
Status: DISCONTINUED | OUTPATIENT
Start: 2017-10-09 | End: 2017-10-13 | Stop reason: HOSPADM

## 2017-10-09 RX ADMIN — DOCUSATE SODIUM 100 MG: 100 CAPSULE, LIQUID FILLED ORAL at 09:46

## 2017-10-09 RX ADMIN — VANCOMYCIN HYDROCHLORIDE 500 MG: 500 INJECTION, POWDER, LYOPHILIZED, FOR SOLUTION INTRAVENOUS at 04:00

## 2017-10-09 RX ADMIN — HEPARIN SODIUM 5000 UNITS: 5000 INJECTION, SOLUTION INTRAVENOUS; SUBCUTANEOUS at 20:35

## 2017-10-09 RX ADMIN — ALBUTEROL SULFATE 2.5 MG: 2.5 SOLUTION RESPIRATORY (INHALATION) at 11:54

## 2017-10-09 RX ADMIN — IPRATROPIUM BROMIDE AND ALBUTEROL SULFATE 1 AMPULE: .5; 3 SOLUTION RESPIRATORY (INHALATION) at 20:10

## 2017-10-09 RX ADMIN — INSULIN LISPRO 1 UNITS: 100 INJECTION, SOLUTION INTRAVENOUS; SUBCUTANEOUS at 20:35

## 2017-10-09 RX ADMIN — HEPARIN SODIUM 5000 UNITS: 5000 INJECTION, SOLUTION INTRAVENOUS; SUBCUTANEOUS at 16:15

## 2017-10-09 RX ADMIN — ASPIRIN 81 MG: 81 TABLET, COATED ORAL at 09:46

## 2017-10-09 RX ADMIN — SODIUM CHLORIDE: 9 INJECTION, SOLUTION INTRAVENOUS at 20:35

## 2017-10-09 RX ADMIN — METHYLPREDNISOLONE SODIUM SUCCINATE 40 MG: 40 INJECTION, POWDER, FOR SOLUTION INTRAMUSCULAR; INTRAVENOUS at 09:45

## 2017-10-09 RX ADMIN — METHYLPREDNISOLONE SODIUM SUCCINATE 40 MG: 40 INJECTION, POWDER, FOR SOLUTION INTRAMUSCULAR; INTRAVENOUS at 02:13

## 2017-10-09 RX ADMIN — LEVOFLOXACIN 500 MG: 5 INJECTION, SOLUTION INTRAVENOUS at 02:13

## 2017-10-09 RX ADMIN — NOREPINEPHRINE BITARTRATE 2 MCG/MIN: 1 INJECTION, SOLUTION, CONCENTRATE INTRAVENOUS at 02:40

## 2017-10-09 RX ADMIN — INSULIN LISPRO 2 UNITS: 100 INJECTION, SOLUTION INTRAVENOUS; SUBCUTANEOUS at 17:12

## 2017-10-09 RX ADMIN — METHYLPREDNISOLONE SODIUM SUCCINATE 40 MG: 40 INJECTION, POWDER, FOR SOLUTION INTRAMUSCULAR; INTRAVENOUS at 20:34

## 2017-10-09 RX ADMIN — SODIUM CHLORIDE: 9 INJECTION, SOLUTION INTRAVENOUS at 01:06

## 2017-10-09 RX ADMIN — MOMETASONE FUROATE AND FORMOTEROL FUMARATE DIHYDRATE 2 PUFF: 200; 5 AEROSOL RESPIRATORY (INHALATION) at 20:10

## 2017-10-09 RX ADMIN — IPRATROPIUM BROMIDE AND ALBUTEROL SULFATE 1 AMPULE: .5; 3 SOLUTION RESPIRATORY (INHALATION) at 15:54

## 2017-10-09 RX ADMIN — SODIUM CHLORIDE: 9 INJECTION, SOLUTION INTRAVENOUS at 12:30

## 2017-10-09 RX ADMIN — ALBUTEROL SULFATE 2.5 MG: 2.5 SOLUTION RESPIRATORY (INHALATION) at 08:01

## 2017-10-09 RX ADMIN — Medication 1 TABLET: at 09:46

## 2017-10-09 RX ADMIN — DOCUSATE SODIUM 100 MG: 100 CAPSULE, LIQUID FILLED ORAL at 20:35

## 2017-10-09 RX ADMIN — METHYLPREDNISOLONE SODIUM SUCCINATE 40 MG: 40 INJECTION, POWDER, FOR SOLUTION INTRAMUSCULAR; INTRAVENOUS at 16:10

## 2017-10-09 ASSESSMENT — PAIN SCALES - GENERAL
PAINLEVEL_OUTOF10: 0

## 2017-10-09 NOTE — CARE COORDINATION
Social Work:  Writer spoke with admissions at [facility], informed her PT/OT notes are available now. Jaren Horne will pull clinical information and start precert. Family updated.

## 2017-10-09 NOTE — FLOWSHEET NOTE
10/09/17 0802 10/09/17 0803 10/09/17 0804   Oxygen Therapy   SpO2 (!) 87 % (!) 82 % 91 %   Pulse Oximeter Device Mode Continuous Continuous Continuous   Pulse Oximeter Device Location Finger Finger Finger   O2 Device None (Room air) Nasal cannula Nasal cannula   O2 Flow Rate (L/min) 0 L/min 2 L/min 2 L/min       10/09/17 0805   Oxygen Therapy   SpO2 96 %   Pulse Oximeter Device Mode Continuous   Pulse Oximeter Device Location Finger   O2 Device Nasal cannula   O2 Flow Rate (L/min) 2 L/min     Patient Spo2 noted @ 87 then 82% on Room Air. Patient placed on Nasal cannula @ 2 L/min at this time. Patient sleeping on/off. Mindy, RT aware and continues to monitor. Patient has known pulmonary fibrosis, emphysema and hx of CHF. Will continue to monitor.

## 2017-10-09 NOTE — PROGRESS NOTES
reading       Objective   Vision: Impaired  Vision Exceptions: Wears glasses for reading  Hearing: Exceptions to [facility]  Hearing Exceptions: Hard of hearing/hearing concerns    Orientation  Overall Orientation Status: Impaired  Orientation Level: Oriented to place; Disoriented to time  Observation/Palpation  Posture: Fair  Observation: Pt is extremely frail, kyphotic, forward head. Edema: small decub on sacral area per RN  Balance  Sitting Balance: Contact guard assistance  Standing Balance: Minimal assistance  Standing Balance  Sit to stand: Minimal assistance  Stand to sit: Minimal assistance  Functional Mobility  Functional - Mobility Device: No device  Assist Level: Minimal assistance  Functional Mobility Comments: pt only able to tolerate several steps from bed to chair  ADL  Feeding: Setup (very small appetite; encouragement to eat more)  Grooming: Minimal assistance  UE Bathing: Moderate assistance  LE Bathing: Maximum assistance  UE Dressing: Moderate assistance  LE Dressing: Maximum assistance  Toileting:  Moderate assistance;Maximum assistance  Tone RUE  RUE Tone: Normotonic  Tone LUE  LUE Tone: Normotonic  Coordination  Movements Are Fluid And Coordinated: Yes     Bed mobility  Supine to Sit: Minimal assistance  Sit to Supine: Minimal assistance  Scooting: Minimal assistance  Transfers  Sit to stand: Minimal assistance  Stand to sit: Minimal assistance     Cognition  Overall Cognitive Status: Exceptions  Safety Judgement: Decreased awareness of need for safety;Decreased awareness of need for assistance  Problem Solving: Assistance required to generate solutions;Assistance required to correct errors made;Assistance required to implement solutions;Decreased awareness of errors  Insights: Decreased awareness of deficits  Cognition Comment: dec insight and jugement noted with  abilities, need for help  Perception  Overall Perceptual Status: WFL     Sensation  Overall Sensation Status: WFL (history of feet being somewhat numb)        LUE AROM (degrees)  LUE AROM : WFL  RUE AROM (degrees)  RUE AROM : WFL  LUE Strength  Gross LUE Strength: WFL (B UE's 3+/5)  RUE Strength  Gross RUE Strength: WFL                  Assessment   Performance deficits / Impairments: Decreased functional mobility ; Decreased ADL status; Decreased strength;Decreased safe awareness;Decreased cognition;Decreased balance;Decreased endurance  Prognosis: Good  Decision Making: Medium Complexity  Patient Education: OT POC, discharge recommendations, safety with  mob and transfers, benefits of sitting up in chair  Discharge Recommendations: [address] UP: Yes  Activity Tolerance  Activity Tolerance: Patient limited by fatigue  Safety Devices  Safety Devices in place: Yes  Type of devices: Call light within reach; Left in chair;Nurse notified;Gait belt;Patient at risk for falls        Discharge Recommendations:  [address] [place] [address]  Times per week: 4-5x/week, 1-2x/day  Current Treatment Recommendations: Strengthening, Balance Training, Functional Mobility Training, Endurance Training, Safety Education & Training, Self-Care / ADL, Patient/Caregiver Education & Training, Equipment Evaluation, Education, & procurement    G-Code  OT G-codes  Functional Assessment Tool Used: [employer] \"6 clicks\" Inpatient Daily Activity Short form  Score: 13  Functional Limitation: Self care  Self Care Current Status (): At least 60 percent but less than 80 percent impaired, limited or restricted  Self Care Goal Status ():  At least 1 percent but less than 20 percent impaired, limited or restricted  OutComes Score                                           Goals  Short term goals  Time Frame for Short term goals: by discharge, pt will  Short term goal 1: demo CGA with ADL transfers with min cues for good safety  Short term goal 2: demo CGA with functional mob in room for ADL completion with min cues for safety  Short term goal 3: demo min A with toileting routine  Short term goal 4: demo min A UB/LB ADLs following set up at approp level  Short term goal 5: verb understanding of fall prevention techs and equip needs   Patient Goals   Patient goals : to go home when able; family would like pt to go to SNF and then look for AL       Therapy Time   Individual Concurrent Group Co-treatment   Time In 1019 (+ 8 min chart review)         Time Out 1050         Minutes 31              Patient would benefit from SNF for continued occupational therapy to increase independence with  ADL of bathing, dressing, toileting and grooming. Writer recommending SNF placement for for activity tolerance and strength which will increase independence with ADL's coordinated with bed mobility and chair transfers. Continued skilled OT services to address decreased safety awareness with ADL and IADL tasks and for education and increased independence with DME and AE for fall prevention and ec/ws techniques prior to d/c home.     Mckenzie Raymond, OT

## 2017-10-09 NOTE — ED NOTES
Spoke with dr Berenice Vega and updated on VS, ABGs and informed him of critical trop of 0.18     Cristina Blanc RN  10/08/17 [id number]

## 2017-10-09 NOTE — PROGRESS NOTES
Pressure distribution mattress ordered due to high risk for pressure ulcers and current breakdown noted to coccyx. Patient has red area to spine noted, pillow support provided and patient able to turn herself easily. Patient is just very thin (onyl 78#) and kyphosis noted so spine makes direct contact with mattress. Family remains at bedside and aware that pressure distribution mattress has been ordered and central supply states it will be here within a few hours. Patient agreeable and eager for comfort.

## 2017-10-09 NOTE — PLAN OF CARE
Problem: Risk for Impaired Skin Integrity  Goal: Tissue integrity - skin and mucous membranes  Structural intactness and normal physiological function of skin and  mucous membranes. Intervention: SKIN ASSESSMENT  Patient has multiple issues with skin. Patient has open scabs and scratches to her lower legs. Patient states that her cat \"Ethan\" bites and scratches her because he is a young playful leandra and has been on Keflex in the past due to these open areas. Patient also has very thin skin and only weighs 78#. Patient is malnourished and still is not eating well. She also has a wound on her coccyx. Mepilex remains in place at this time. Consult for wound care consult, dietary consult, PT/OT and pressure distribution mattress ordered. Patient also utilizing a chair cushion when up to the chair. Much bruising, ecchymosis and thin skin noted to all extremities. Intervention: PRESSURE ULCER PREVENTION  Patient has multiple issues with skin. Patient has open scabs and scratches to her lower legs. Patient states that her cat \"Ethan\" bites and scratches her because he is a young playful leandra and has been on Keflex in the past due to these open areas. Patient also has very thin skin and only weighs 78#. Patient is malnourished and still is not eating well. She also has a wound on her coccyx. Mepilex remains in place at this time. Consult for wound care consult, dietary consult, PT/OT and pressure distribution mattress ordered. Patient also utilizing a chair cushion when up to the chair. Much bruising, ecchymosis and thin skin noted to all extremities. Intervention: TURN PATIENT  Patient is able to turn herself independently but does require assistance to boost herself in bed as she sinks to the crease in the middle often.  Patient is very thin and patient is assisted with position changes often and pillow support due to noted kyphosis which places her spine against the mattress first. Noted red spot to mid spine today. Pressure redistribution mattress ordered for patient comfort and safety. Problem: Falls - Risk of  Intervention: Fall risk assessment  Pt remains free of falls and verbalizes understanding of individual fall risks. Pt wearing non skid footwear, bed locked and in lowest position. Side rails up 2/4. Pt has call light and tray table within reach and encouraged to seek out staff for any assistance needed. Pt uses call light appropriately. Intervention: Postfall assessment  NO falls this shift. Intervention: Fall precautions  Pt remains free of falls and verbalizes understanding of individual fall risks. Pt wearing non skid footwear, bed locked and in lowest position. Side rails up 2/4. Pt has call light and tray table within reach and encouraged to seek out staff for any assistance needed. Pt uses call light appropriately. Intervention: Toileting assistance  Patient has cronin in place and it is currently patent. Patient did not ask to get up to use the bathroom for bowel movement this shift. Goal: Absence of falls  Outcome: Met This Shift  No falls noted this shift. Problem: Neurological  Intervention: OT Evaluation/treatment  PT and OT evaluation ordered per MD.       Problem: Musculor/Skeletal Functional Status  Intervention: Provide standby assistance  Standby assist x 1   Intervention: PT Evaluation/treatment  PT and OT evaluation ordered per MD.   Intervention: OT Evaluation/treatment  PT and OT evaluation ordered per MD.     Goal: Highest potential functional level  Outcome: Ongoing  Recommended rehab in SNF per PT/OT and Dr. Rochelle Dee.

## 2017-10-09 NOTE — FLOWSHEET NOTE
10/09/17 1200   Nutrition   Feeding Able to feed self;Refused meal  (patient states \"I'm not hungry\" - ate 2-3 bites only. )     Patient still resistive to eating. Family cut everything up and encouraged her to eat and even offered to go get other outside food. Patient declined and states \"I don't really eat much and I'm not hungry. \"  Patient and RN discussed that patient is only 78# and nutrients will help with healing and help her stay healthy. Patient daughter in law, Johanna Loomis, states that they encourage boost but she will maybe drink 1/2 to 1 boost a day and that is all she will drink. Per Johanna Loomis, patient is very independent and resistive to things like going to a rehab or independent living and having people help her. Per family, they are afraid for their parents as the father has dementia and she has been sleeping on the cough downstairs because their bedroom is on the second floor and she can't get up there. Social work consult obtained, Dietary Consult obtained and Wound care nurse consult obtained.

## 2017-10-09 NOTE — H&P
Department of Internal Medicine  Attending History and Physical        CHIEF COMPLAINT:  Unresponsiveness    History Obtained From:  EHR, Patient    HISTORY OF PRESENT ILLNESS:   80year old female, with COPD was brought to the ER for hypotension and unresponsiveness. She was stabilized in the ER , subsequently admitted. About 4 days ago noted onset of productive cough and  progressive shortness shortness of breath. She requested an antibiotic because she could not get to the office. Pt states her productive cough got better but shortness of breath did not. During this time she also did not eat because of loss of appetite. Imaging studies in the ER showed a negative brain CT, chest x-ray showed no consolidation. ABGs showed significant elevation of her pCO2, with  respiratory acidosis. Past Medical/Surgical History:  Past Medical History:   Diagnosis Date    CAD (coronary artery disease)     [facility]     Colon polyp     COPD [facility]chronic obstructive [facility] disease) [facility]     Hypertension     Hyperthyroidism     Pelvic fracture (HCC)     Unspecified cerebral artery occlusion with cerebral infarction          Past Surgical History:   Procedure Laterality Date    BREAST SURGERY      COLONOSCOPY      CORONARY ANGIOPLASTY WITH STENT PLACEMENT  2010    HYSTERECTOMY      JOINT REPLACEMENT      THYROIDECTOMY N/A     partial     VASCULAR SURGERY         No current facility-administered medications on file prior to encounter. Current Outpatient Prescriptions on File Prior to Encounter   Medication Sig Dispense Refill    cephALEXin (KEFLEX) 500 MG capsule Take 1 capsule by mouth 3 times daily 21 capsule 0    furosemide (LASIX) 20 MG tablet Take 20 mg by mouth daily      lisinopril (PRINIVIL;ZESTRIL) 5 MG tablet Take 20 mg by mouth daily       simvastatin (ZOCOR) 40 MG tablet Take 40 mg by mouth nightly.  metoprolol (LOPRESSOR) 25 MG tablet Take 25 mg by mouth 2 times daily.         aspirin 81 MG tablet Take 81 mg by mouth daily.  ondansetron (ZOFRAN) 4 MG tablet Take 1 tablet by mouth every 8 hours as needed for Nausea 20 tablet 0    docusate sodium (COLACE, DULCOLAX) 100 MG CAPS Take 100 mg by mouth 2 times daily 60 capsule 1    metoprolol (LOPRESSOR) 25 MG tablet Take 25 mg by mouth 2 times daily      furosemide (LASIX) 20 MG tablet Take 20 mg by mouth daily      Multiple Vitamins-Minerals (THERAPEUTIC MULTIVITAMIN-MINERALS) tablet Take 1 tablet by mouth daily      calcium-vitamin D (OSCAL-500) 500-200 MG-UNIT per tablet Take 1 tablet by mouth daily      Calcium Carbonate-Vitamin D (CALCIUM + D) 600-200 MG-UNIT TABS Take  by mouth daily.  multivitamin (THERAGRAN) per tablet Take 1 tablet by mouth daily.  albuterol (ACCUNEB) 0.63 MG/3ML nebulizer solution Take 1 ampule by nebulization every 6 hours as needed. Allergies:  Augmentin [amoxicillin-pot clavulanate]    Social History:   Social History     Social History    Marital status:      Spouse name: N/A    Number of children: N/A    Years of education: N/A     Occupational History    Not on file. Social History Main Topics    Smoking status: Former Smoker    Smokeless tobacco: Never Used    Alcohol use No    Drug use: No    Sexual activity: Not on file     Other Topics Concern    Not on file     Social History Narrative    ** Merged History Encounter **            Family History:   None in record. REVIEW OF SYSTEMS:  CONSTITUTIONAL:  Positive for weight loss. EYES:  negative for  double vision, dry eyes and blind spots  HEENT:  negative for  ear drainage, earaches, nasal congestion and epistaxis. Hearing impairment. RESPIRATORY:  Present illness. CARDIOVASCULAR:  negative for  palpitations, exertional chest pressure/discomfort, edema  GASTROINTESTINAL:  negative for hematemesis and hemtochezia. Positive for loss of appetite.   GENITOURINARY:  negative for frequency, dysuria and hematuria  INTEGUMENT/BREAST:  negative for rash and skin color change  HEMATOLOGIC/LYMPHATIC:  negative for easy bruising, bleeding and petechiae  ALLERGIC/IMMUNOLOGIC:  See list.  ENDOCRINE:  negative for heat intolerance and cold intolerance  MUSCULOSKELETAL:  Positive for joint pains. NEUROLOGICAL:  negative for dizziness, seizures and speech problems  BEHAVIOR/PSYCH:  negative for depressed mood    PHYSICAL EXAM:  Vitals:  BP (!) 87/49   Pulse 100   Temp 97.2 °F (36.2 °C) (Oral)   Resp 16   Ht 5' 2\" (1.575 m)   Wt 80 lb (36.3 kg)   SpO2 99%   BMI 14.63 kg/m²     General Appearance: alert and oriented to person, place and time and in no acute distress  Skin: warm and dry and no rash or erythema  Head: normocephalic and atraumatic  Eyes: pupils equal, round, and reactive to light, extraocular eye movements intact, conjunctivae normal and sclera anicteric  ENT: oropharynx clear and moist with normal mucous membranes  Neck: neck supple and non tender without mass, no thyromegaly and no thyroid nodules   Pulmonary/Chest: no chest wall tenderness. Diminished breath sounds to both lung fields. No wheezing/rales/rubs. Cardiovascular: sinus tach, regular rhythm, normal S1 and S2, no gallops, intact distal pulses and no carotid bruits  Abdomen: soft, non-tender, non-distended, bowel sounds normal, no masses, no organomegaly and no abdominal bruits  Genitourinary: deferred. Extremities: no cyanosis, no clubbing, no edema and Kaylie's sign negative bilaterally  Musculoskeletal: no swollen joints, no tender joints and no crepitus  Neurologic: no cranial nerve deficit and speech normal. Gait not tested. DATA:  Gasses: pH: 7.23  pCO2: 94   pO2: 132  HCO3: 39  Chest X-ray: redemonstration of emphysema with fibrotic changes. No consolidation.   Troponin: 0.8  CT brain: no acute findings  CT Abdomen: no acute findings    See details of CT report in EHR    ASSESSMENT AND PLAN:      Principal Problem:    Acute on chronic respiratory failure with hypercapnia (HCC)  Active Problems:    CO2 narcosis    Elevated troponin    Hypotension    Chronic obstructive pulmonary disease with acute exacerbation (HCC)    PLAN:  Discussed with Dr. Arnoldo Sanderson (ER Attending)  Prelim/admitting orders made including further labs/studies/specialty consult  See details of orders entered in EHR.     Electronically signed by Risa Villanueva MD on 10/8/2017 at 11:42 PM

## 2017-10-09 NOTE — CARE COORDINATION
Case Management Initial Discharge Plan  Braeden Guerrero,         Readmission Risk              Readmission Risk:        14.75       Age 72 or Greater:  1    Admitted from SNF or Requires Paid or Family Care:  0    Currently has CHF,COPD,ARF,CRI,or is on dialysis:  4    Takes more than 5 Prescription Medications:  4    Takes Digoxin,Insulin,Anticoagulants,Narcotics or ASA/Plavix:  2    [address] in Past 12 Months:  0    On Disability:  0    Patient Considers own Health:  3.75            Met with:patient, spouse/SO or family member patient, son and daughter to discuss discharge plans. Information verified: address, contacts, phone number, , insurance Yes  PCP: Loly Villafana MD  Date of last visit:     Insurance Provider: [facility], [facility]    Discharge Planning  Current Residence:     Living Arrangements:  Spouse/Significant Other   Home has  stories/ stairs to climb  Support Systems:  Spouse/Significant Other, Children  Current Services PTA:    Supplier: none  Patient able to perform ADL's:Assisted  DME used to aid ambulation prior to admission: /during admission    Potential Assistance Needed:  [address]:    Potential Assistance Purchasing Medications:  No  Does patient want to participate in local refill/ meds to beds program?  No    Patient agreeable to home care: Yes  Freedom of choice provided:  yes      Type of Home Care Services:  None  Patient expects to be discharged to:  home    Prior SNF/Rehab Placement and Facility: [facility]- would not return  Agreeable to SNF/Rehab: Yes  [place] of choice provided: yes   Evaluation: yes    Expected Discharge date:  10/13/17  Follow Up Appointment: Best Day/ Time:      Transportation provider: family  Transportation arrangements needed for discharge: Yes    Discharge Plan: Writer met with pt, with family present. Pt is agreeable to SNF placement. FIrst choice is [facility]. Second choice is [facility].

## 2017-10-09 NOTE — PROGRESS NOTES
ipratropium-albuterol (DUONEB) nebulizer solution 1 ampule Q4H While awake   mometasone-formoterol (DULERA) 200-5 MCG/ACT inhaler 2 puff BID   heparin (porcine) injection 5,000 Units BID   0.9 % sodium chloride infusion Continuous       Physical Examination:  BP (!) 106/57   Pulse 110   Temp 98.8 °F (37.1 °C) (Temporal)   Resp 16   Ht 5' 2\" (1.575 m)   Wt 78 lb 14.8 oz (35.8 kg)   SpO2 93%   BMI 14.44 kg/m²   General appearance -  in no distress  Mental status - alert, oriented to person, place, and time. Eyes - pupils equal and reactive, extraocular eye movements intact. Mouth - mucous membranes moist.  Neck - supple, no significant adenopathy. Chest - diminished breath sounds on both lung fields. no wheezes, rales or rhonchi, symmetric air entry  Heart - sinus tach,regular rhythm, normal S1, S2, no murmurs, rubs, clicks or gallops  Abdomen - soft, nontender, nondistended, no masses or organomegaly  Neurological - alert, oriented, normal speech, no focal findings. Extremities - peripheral pulses normal, no pedal edema, no clubbing or cyanosis. Negative Kaylie's.      Labs:  Hematology:  Recent Labs      10/08/17   1845  10/09/17   0518   WBC  12.4*  13.0*   HGB  14.1  12.9   HCT  43.5  39.9   PLT  227  234     Chemistry:  Recent Labs      10/08/17   1845  10/09/17   0518   NA  143  145*   K  5.2  5.4*   CL  100  105   CO2  28  30   GLUCOSE  166*  200*   BUN  32*  40*   CREATININE  1.51*  1.66*   MG  2.7*   --    CALCIUM  9.0  8.5*     Recent Labs      10/08/17   1845  10/08/17   2145  10/08/17   2214  10/09/17   0042   PROT  6.7   --    --    --    LABALBU  3.3*   --    --    --    AST  549*   --    --    --    ALT  503*   --    --    --    ALKPHOS  92   --    --    --    BILITOT  0.74   --    --    --    AMMONIA   --    --   29   --    AMYLASE  90   --    --    --    LIPASE  58   --    --    --    CKTOTAL  61  103   --   143   CKMB  5.0  12.6*   --   16.9*     Repeat ABG:  pCO2: 58.9 (baseline 94), pH:

## 2017-10-09 NOTE — FLOWSHEET NOTE
Patient resting in bed. There are three visitors present at bedside. Patient states that she \"doesn't hear well,\" but does engage briefly with writer. She expresses no needs at this time, but expresses thanks for visit. Visitors express no needs. Writer offers brief support and presence. Spiritual Care will follow as needed. 10/09/17 1542   Encounter Summary   Services provided to: Patient and family together   Referral/Consult From: [address] [place] [address]; Children;Family members   Continue Visiting (10/9/17)   Complexity of Encounter Low   Length of Encounter 15 minutes   Spiritual Assessment Completed Yes   Routine   Type Initial   Assessment Approachable   Intervention Active listening   Outcome Engaged in conversation;Expressed gratitude

## 2017-10-09 NOTE — CONSULTS
Inpatient consult to Pulmonology  Consult performed by: Luis F Warren  Consult ordered by: Mariana Jimenez         Pulmonary Medicine and [address] MD      Patient - Gibran Bear   MRN -  [id number]   Kayla # - [de-identified]   - 11/10/1925      Date of Admission -  10/8/2017  6:16 PM  Date of evaluation -  10/9/2017  Room -    Mary Ruiz MD Primary Care Physician - Marc Garibay MD     Reason for Consult    Acute respiratory failure    Assessment   · Acute respiratory failure  · Acute exacerbation of COPD/Severe emphysema  · Tracheo-bronchitis  · Hypotension/dehydration  · BONILLA  · History of 30-40-pack-year smoking    Recommendations   · Continue IV antibiotics  · IV solu-medrol  · Albuterol and Ipratropium Q 4 hours and prn  · Dulera 200  · X-ray chest in am  · Labs: CBC and BMP in am  · BiPAP, When necessary  · Echo  · IV fluids  · Titrate Levophed  · Obtain serum cortisol level  · Monitor creatinine  · 2 liters/min via nasal cannula  · DVT prophylaxis with low molecular weight heparin  · Will follow with you    Problem List      Patient Active Problem List   Diagnosis    COPD exacerbation ([address]    Hypoxemia requiring supplemental oxygen    Hyperglycemia    Pulmonary fibrosis (HCC)    Malnutrition of moderate degree (HCC)    Sternal fracture    L1 vertebral fracture (HCC)    Pulmonary emphysema ([address]    Acute on chronic respiratory failure with hypercapnia (HCC)    CO2 narcosis    Elevated troponin    Hypotension    Chronic obstructive pulmonary disease with acute exacerbation ([address]    Malnutrition ([address]       HPI     Gibran Bear is 80 y.o.,  female, admitted because of increasing shortness of breath. Patient started worsening shortness of breath close to 2 weeks ago. She was started on Keflex. She did not improve clinically. Yesterday she was found in the floor by her .   At that time she was feeling short of breath and not very responsive. EMS were called. Patient was admitted to CCU after ER evaluation for acute exacerbation of COPD and hypertension. Last night I was called for hypotension so I ordered pan cultured and added vancomycin to Levaquin which was prescribed earlier. This morning patient is sitting up in the chair. She quit smoking close to 30 years ago.     PMHx   Past Medical History      Diagnosis Date    CAD (coronary artery disease)     Cancer ([facility]     Colon polyp     COPD (chronic obstructive pulmonary disease) ([facility]     Hypertension     Hyperthyroidism     Pelvic fracture (HCC)     Unspecified cerebral artery occlusion with cerebral infarction       Past Surgical History        Procedure Laterality Date    BREAST SURGERY      COLONOSCOPY      CORONARY ANGIOPLASTY WITH STENT PLACEMENT  2010    HYSTERECTOMY      JOINT REPLACEMENT      THYROIDECTOMY N/A     partial     VASCULAR SURGERY         Meds    Current Medications    aspirin  81 mg Oral Daily    docusate sodium  100 mg Oral BID    therapeutic multivitamin-minerals  1 tablet Oral Daily    methylPREDNISolone  40 mg Intravenous Q6H    insulin lispro  0-12 Units Subcutaneous TID WC    insulin lispro  0-6 Units Subcutaneous Nightly    [START ON 10/11/2017] levofloxacin  250 mg Intravenous Q48H    Calcium Carb-Cholecalciferol  1 tablet Oral Daily    [START ON 10/10/2017] pneumococcal 13-valent conjugate  0.5 mL Intramuscular Once    [START ON 10/10/2017] influenza virus vaccine  0.5 mL Intramuscular Once    vancomycin (VANCOCIN) intermittent dosing (placeholder)   Other RX Placeholder    albuterol  2.5 mg Nebulization 4x daily     ondansetron, albuterol, glucose, dextrose, glucagon (rDNA), dextrose  IV Drips/Infusions   sodium chloride 100 mL/hr at 10/09/17 1000    norepinephrine 5.973 mcg/min (10/09/17 1000)    dextrose       Home Medications  Prescriptions Prior to Admission: spironolactone (ALDACTONE) 50 MG tablet, Take by mouth daily  cephALEXin (KEFLEX) 500 MG capsule, Take 1 capsule by mouth 3 times daily  furosemide (LASIX) 20 MG tablet, Take 20 mg by mouth daily  lisinopril (PRINIVIL;ZESTRIL) 5 MG tablet, Take 20 mg by mouth daily   simvastatin (ZOCOR) 40 MG tablet, Take 40 mg by mouth nightly. metoprolol (LOPRESSOR) 25 MG tablet, Take 25 mg by mouth 2 times daily. aspirin 81 MG tablet, Take 81 mg by mouth daily. ondansetron (ZOFRAN) 4 MG tablet, Take 1 tablet by mouth every 8 hours as needed for Nausea  docusate sodium (COLACE, DULCOLAX) 100 MG CAPS, Take 100 mg by mouth 2 times daily  metoprolol (LOPRESSOR) 25 MG tablet, Take 25 mg by mouth 2 times daily  furosemide (LASIX) 20 MG tablet, Take 20 mg by mouth daily  Multiple Vitamins-Minerals (THERAPEUTIC MULTIVITAMIN-MINERALS) tablet, Take 1 tablet by mouth daily  calcium-vitamin D (OSCAL-500) 500-200 MG-UNIT per tablet, Take 1 tablet by mouth daily  Calcium Carbonate-Vitamin D (CALCIUM + D) 600-200 MG-UNIT TABS, Take  by mouth daily. multivitamin (THERAGRAN) per tablet, Take 1 tablet by mouth daily. albuterol (ACCUNEB) 0.63 MG/3ML nebulizer solution, Take 1 ampule by nebulization every 6 hours as needed. Allergies    Augmentin [amoxicillin-pot clavulanate]  Social History     Social History   Substance Use Topics    Smoking status: Former Smoker    Smokeless tobacco: Never Used    Alcohol use No     Family History    History reviewed. No pertinent family history.   ROS - 11 systems   General Denies any fever or chills  HEENT Denies any diplopia, tinnitus or vertigo  Resp positive for  dry cough and dyspnea  Cardiac Denies any chest pain, palpitations, claudication or edema  GI Denies any melena, hematochezia, hematemesis or pyrosis   Denies any frequency, urgency, hesitancy or incontinence  Heme Denies bruising or bleeding easily  Endocrine Denies any history of diabetes or thyroid disease  Neuro Denies any focal motor or sensory deficits  Psychiatric Denies anxiety, depression, suicidal ideation  Skin Denies rashes, itching, open sores    Vitals     height is 5' 2\" (1.575 m) and weight is 78 lb 14.8 oz (35.8 kg). Her temporal temperature is 97.6 °F (36.4 °C). Her blood pressure is 121/107 (abnormal) and her pulse is 99. Her respiration is 23 and oxygen saturation is 93%. Body mass index is 14.44 kg/m². I/O      Intake/Output Summary (Last 24 hours) at 10/09/17 1138  Last data filed at 10/09/17 1000   Gross per 24 hour   Intake          1165.73 ml   Output              225 ml   Net           940.73 ml     I/O last 3 completed shifts: In: 743.3 [P.O.:240; I.V.:503.3]  Out: 225 [Urine:225]   Patient Vitals for the past 96 hrs (Last 3 readings):   Weight   10/09/17 0101 78 lb 14.8 oz (35.8 kg)   10/08/17 1824 80 lb (36.3 kg)     Exam   General Appearance  Awake, alert, oriented, in no acute distress  HEENT - Head is normocephalic, atraumatic. Pupil reactive to light  Neck - Supple, symmetrical, trachea midline and Soft, trachea midline and straight  Lungs - positive findings: prolonged expiration, wheezing bibasilar  Cardiovascular - Heart sounds are normal.  Regular rhythm normal rate without murmur, gallop or rub. Abdomen - Soft, nontender, nondistended, no masses or organomegaly  Neurologic - CN II-XII are grossly intact.  There are no focal motor or sensory deficits  Skin - No bruising or bleeding  Extremities - No cyanosis, clubbing or edema    Labs  - Old records and notes have been reviewed in [facility]   CBC   Lab Results   Component Value Date    WBC 13.0 10/09/2017    RBC 4.15 10/09/2017    HGB 12.9 10/09/2017    HCT 39.9 10/09/2017     10/09/2017    MCV 96.1 10/09/2017    MCH 31.1 10/09/2017    MCHC 32.3 10/09/2017    RDW 14.1 10/09/2017    LYMPHOPCT 2 10/09/2017    MONOPCT 2 10/09/2017    BASOPCT 0 10/09/2017    MONOSABS 0.20 10/09/2017    LYMPHSABS 0.30 10/09/2017    EOSABS 0.00 10/09/2017    BASOSABS 0.00 10/09/2017 DIFFTYPE NOT REPORTED 10/09/2017     BMP Lab Results   Component Value Date     10/09/2017    K 5.4 10/09/2017     10/09/2017    CO2 30 10/09/2017    BUN 40 10/09/2017    CREATININE 1.66 10/09/2017    GLUCOSE 200 10/09/2017    CALCIUM 8.5 10/09/2017    MG 2.7 10/08/2017     LFTS  Lab Results   Component Value Date    ALKPHOS 92 10/08/2017     10/08/2017     10/08/2017    PROT 6.7 10/08/2017    BILITOT 0.74 10/08/2017    BILIDIR 0.11 09/26/2015    IBILI 0.36 09/26/2015    LABALBU 3.3 10/08/2017     ABG   Lab Results   Component Value Date    PHART 7.420 12/22/2014    UZI2QFX 43.0 12/22/2014    PO2ART 43.0 12/22/2014    FXQ5RRD 27.4 12/22/2014    UUO5HTU 42 10/08/2017    J7JZUAZI 83.7 12/22/2014     Radiology    CXR    (See actual reports for details)    \"Thank you for asking us to see this patient\"    Case discussed with nurse and patient/family. Questions and concerns addressed.     Electronically signed by     Arely Peña MD on 10/9/2017 at 11:38 AM  Pulmonary Critical Care and Sleep Medicine,  [facility]  Cell: [phone]  Office: [phone]

## 2017-10-09 NOTE — PROGRESS NOTES
as needed.       Historical Provider, MD   .  Recent Surgical History: None = 0     Assessment     Peak Flow (asthma only)    Predicted: na  Personal Best: na  PEF na  % Predicted na  Peak Flow : not applicable = 0    EZA5/TYP    FEV1 Predicted na      FEV1 unable    FEV1 % Predicted na  FVC na  IS volume na  IBW na    RR 20  Breath Sounds: diminish with few rhonchi      · Bronchodilator assessment at level  3  · Hyperinflation assessment at level   · Secretion Management assessment at level    ·   · [x]    Bronchodilator Assessment  BRONCHODILATOR ASSESSMENT SCORE  Score 0 1 2 3 4 5   Breath Sounds   []  Patient Baseline []  No Wheeze good aeration []  Faint, scattered wheezing, good aeration [x]  Expiratory Wheezing and or moderately diminished []  Insp/Exp wheeze and/or very diminished []  Insp/Exp and/ or marked distress   Respiratory Rate   []  Patient Baseline []  Less than 20 []  Less than 20 [x]  20-25 []  Greater than 25 []  Greater than 25   Peak flow % of Pred or PB [x]  NA   []  Greater than 90%  []  81-90% []  71-80% []  Less than or equal to 70%  or unable to perform []  Unable due to Respiratory Distress   Dyspnea re []  Patient Baseline []  No SOB []  No SOB [x]  SOB on exertion []  SOB min activity []  At rest/acute   e FEV% Predicted       []  NA []  Above 69%  []  Unable []  Above 60-69%  []  Unable []  Above 50-59%  [x]  Unable []  Above 35-49%  []  Unable []  Less than 35%  []  Unable                 []  Hyperinflation Assessment  Score 1 2 3   CXR and Breath Sounds   []  Clear []  No atelectasis  Basilar aeration []  Atelectasis or absent basilar breath sounds   Incentive Spirometry Volume  (Per IBW)   []  Greater than or equal to 15ml/Kg []  less than 15ml/Kg []  less than 15ml/Kg   Surgery within last 2 weeks []  None or general   []  Abdominal or thoracic surgery  []  Abdominal or thoracic   Chronic Pulmonary Historyre []  No []  Yes []  Yes     []  Secretion Management Assessment  Score 1 2

## 2017-10-09 NOTE — FLOWSHEET NOTE
10/09/17 1100   Fall Risk Interventions   Hourly Visual Checks Awake; In chair   Fall Visual Posted Socks   Room Door Open Yes   Alarm On Bed   Mobility   Activity In bed   Level of Assistance Standby assist, set-up cues, supervision of patient - no hands on   Assistive Device Cane   Distance Ambulated (ft) 10 ft   Patient ambulated with 1 assist to chair. PT/OT at bedside for evaluation. Patient tolerated well. Waffle cushion placed in chair for comfort and safety. Will continue to monitor.

## 2017-10-10 ENCOUNTER — APPOINTMENT (OUTPATIENT)
Dept: GENERAL RADIOLOGY | Age: 82
DRG: 189 | End: 2017-10-10
Payer: MEDICARE

## 2017-10-10 PROBLEM — E43 PROTEIN-CALORIE MALNUTRITION, SEVERE (HCC): Status: ACTIVE | Noted: 2017-10-09

## 2017-10-10 LAB
ABSOLUTE EOS #: 0 K/UL (ref 0–0.4)
ABSOLUTE LYMPH #: 0.3 K/UL (ref 1–4.8)
ABSOLUTE MONO #: 0.2 K/UL (ref 0.2–0.8)
ANION GAP SERPL CALCULATED.3IONS-SCNC: 7 MMOL/L (ref 9–17)
BASOPHILS # BLD: 0 %
BASOPHILS ABSOLUTE: 0 K/UL (ref 0–0.2)
BUN BLDV-MCNC: 39 MG/DL (ref 8–23)
BUN/CREAT BLD: 33 (ref 9–20)
CALCIUM SERPL-MCNC: 8.8 MG/DL (ref 8.6–10.4)
CHLORIDE BLD-SCNC: 104 MMOL/L (ref 98–107)
CO2: 31 MMOL/L (ref 20–31)
CORTISOL COLLECTION INFO: ABNORMAL
CORTISOL: 20.4 UG/DL (ref 2.7–18.4)
CREAT SERPL-MCNC: 1.19 MG/DL (ref 0.5–0.9)
DIFFERENTIAL TYPE: ABNORMAL
EOSINOPHILS RELATIVE PERCENT: 0 %
GFR AFRICAN AMERICAN: 52 ML/MIN
GFR NON-AFRICAN AMERICAN: 43 ML/MIN
GFR SERPL CREATININE-BSD FRML MDRD: ABNORMAL ML/MIN/{1.73_M2}
GFR SERPL CREATININE-BSD FRML MDRD: ABNORMAL ML/MIN/{1.73_M2}
GLUCOSE BLD-MCNC: 120 MG/DL (ref 65–105)
GLUCOSE BLD-MCNC: 123 MG/DL (ref 65–105)
GLUCOSE BLD-MCNC: 135 MG/DL (ref 65–105)
GLUCOSE BLD-MCNC: 146 MG/DL (ref 65–105)
GLUCOSE BLD-MCNC: 151 MG/DL (ref 70–99)
HCT VFR BLD CALC: 35.9 % (ref 36–46)
HEMOGLOBIN: 11.8 G/DL (ref 12–16)
LACTIC ACID: 1.7 MMOL/L (ref 0.5–2.2)
LACTIC ACID: 2.7 MMOL/L (ref 0.5–2.2)
LACTIC ACID: 2.9 MMOL/L (ref 0.5–2.2)
LACTIC ACID: 2.9 MMOL/L (ref 0.5–2.2)
LYMPHOCYTES # BLD: 2 %
MCH RBC QN AUTO: 30.6 PG (ref 26–34)
MCHC RBC AUTO-ENTMCNC: 32.9 G/DL (ref 31–37)
MCV RBC AUTO: 93.1 FL (ref 80–100)
MONOCYTES # BLD: 2 %
PDW BLD-RTO: 13.2 % (ref 11.5–14.5)
PLATELET # BLD: 185 K/UL (ref 130–400)
PLATELET ESTIMATE: ABNORMAL
PMV BLD AUTO: ABNORMAL FL (ref 6–12)
POTASSIUM SERPL-SCNC: 5.3 MMOL/L (ref 3.7–5.3)
RBC # BLD: 3.86 M/UL (ref 4–5.2)
RBC # BLD: ABNORMAL 10*6/UL
SEG NEUTROPHILS: 96 %
SEGMENTED NEUTROPHILS ABSOLUTE COUNT: 11.1 K/UL (ref 1.8–7.7)
SODIUM BLD-SCNC: 142 MMOL/L (ref 135–144)
WBC # BLD: 11.6 K/UL (ref 3.5–11)
WBC # BLD: ABNORMAL 10*3/UL

## 2017-10-10 PROCEDURE — 83605 ASSAY OF LACTIC ACID: CPT

## 2017-10-10 PROCEDURE — 6360000002 HC RX W HCPCS: Performed by: NURSE PRACTITIONER

## 2017-10-10 PROCEDURE — 2000000000 HC ICU R&B

## 2017-10-10 PROCEDURE — 6370000000 HC RX 637 (ALT 250 FOR IP): Performed by: INTERNAL MEDICINE

## 2017-10-10 PROCEDURE — 94640 AIRWAY INHALATION TREATMENT: CPT

## 2017-10-10 PROCEDURE — 2580000003 HC RX 258: Performed by: INTERNAL MEDICINE

## 2017-10-10 PROCEDURE — 6360000002 HC RX W HCPCS: Performed by: INTERNAL MEDICINE

## 2017-10-10 PROCEDURE — 97116 GAIT TRAINING THERAPY: CPT

## 2017-10-10 PROCEDURE — 36600 WITHDRAWAL OF ARTERIAL BLOOD: CPT

## 2017-10-10 PROCEDURE — 82533 TOTAL CORTISOL: CPT

## 2017-10-10 PROCEDURE — 80048 BASIC METABOLIC PNL TOTAL CA: CPT

## 2017-10-10 PROCEDURE — 82803 BLOOD GASES ANY COMBINATION: CPT

## 2017-10-10 PROCEDURE — 71010 XR CHEST PORTABLE: CPT

## 2017-10-10 PROCEDURE — 94761 N-INVAS EAR/PLS OXIMETRY MLT: CPT

## 2017-10-10 PROCEDURE — 36415 COLL VENOUS BLD VENIPUNCTURE: CPT

## 2017-10-10 PROCEDURE — 82947 ASSAY GLUCOSE BLOOD QUANT: CPT

## 2017-10-10 PROCEDURE — 85025 COMPLETE CBC W/AUTO DIFF WBC: CPT

## 2017-10-10 PROCEDURE — 2700000000 HC OXYGEN THERAPY PER DAY

## 2017-10-10 PROCEDURE — 97110 THERAPEUTIC EXERCISES: CPT

## 2017-10-10 PROCEDURE — 97530 THERAPEUTIC ACTIVITIES: CPT | Performed by: NURSE PRACTITIONER

## 2017-10-10 RX ORDER — POTASSIUM CHLORIDE 750 MG/1
10 TABLET, EXTENDED RELEASE ORAL DAILY
Status: ON HOLD | COMMUNITY
End: 2017-10-13 | Stop reason: HOSPADM

## 2017-10-10 RX ORDER — METHYLPREDNISOLONE SODIUM SUCCINATE 40 MG/ML
30 INJECTION, POWDER, LYOPHILIZED, FOR SOLUTION INTRAMUSCULAR; INTRAVENOUS EVERY 8 HOURS
Status: DISCONTINUED | OUTPATIENT
Start: 2017-10-10 | End: 2017-10-11

## 2017-10-10 RX ORDER — LISINOPRIL 20 MG/1
40 TABLET ORAL DAILY
Status: ON HOLD | COMMUNITY
End: 2018-01-19 | Stop reason: HOSPADM

## 2017-10-10 RX ADMIN — DOCUSATE SODIUM 100 MG: 100 CAPSULE, LIQUID FILLED ORAL at 21:35

## 2017-10-10 RX ADMIN — DOCUSATE SODIUM 100 MG: 100 CAPSULE, LIQUID FILLED ORAL at 09:41

## 2017-10-10 RX ADMIN — HEPARIN SODIUM 5000 UNITS: 5000 INJECTION, SOLUTION INTRAVENOUS; SUBCUTANEOUS at 09:41

## 2017-10-10 RX ADMIN — Medication 1 TABLET: at 09:44

## 2017-10-10 RX ADMIN — METHYLPREDNISOLONE SODIUM SUCCINATE 40 MG: 40 INJECTION, POWDER, FOR SOLUTION INTRAMUSCULAR; INTRAVENOUS at 01:30

## 2017-10-10 RX ADMIN — IPRATROPIUM BROMIDE AND ALBUTEROL SULFATE 1 AMPULE: .5; 3 SOLUTION RESPIRATORY (INHALATION) at 20:44

## 2017-10-10 RX ADMIN — INSULIN LISPRO 1 UNITS: 100 INJECTION, SOLUTION INTRAVENOUS; SUBCUTANEOUS at 21:36

## 2017-10-10 RX ADMIN — METHYLPREDNISOLONE SODIUM SUCCINATE 30 MG: 40 INJECTION, POWDER, FOR SOLUTION INTRAMUSCULAR; INTRAVENOUS at 14:43

## 2017-10-10 RX ADMIN — IPRATROPIUM BROMIDE AND ALBUTEROL SULFATE 1 AMPULE: .5; 3 SOLUTION RESPIRATORY (INHALATION) at 15:49

## 2017-10-10 RX ADMIN — MOMETASONE FUROATE AND FORMOTEROL FUMARATE DIHYDRATE 2 PUFF: 200; 5 AEROSOL RESPIRATORY (INHALATION) at 07:45

## 2017-10-10 RX ADMIN — ASPIRIN 81 MG: 81 TABLET, COATED ORAL at 09:41

## 2017-10-10 RX ADMIN — IPRATROPIUM BROMIDE AND ALBUTEROL SULFATE 1 AMPULE: .5; 3 SOLUTION RESPIRATORY (INHALATION) at 11:28

## 2017-10-10 RX ADMIN — METHYLPREDNISOLONE SODIUM SUCCINATE 30 MG: 40 INJECTION, POWDER, FOR SOLUTION INTRAMUSCULAR; INTRAVENOUS at 21:34

## 2017-10-10 RX ADMIN — IPRATROPIUM BROMIDE AND ALBUTEROL SULFATE 1 AMPULE: .5; 3 SOLUTION RESPIRATORY (INHALATION) at 07:44

## 2017-10-10 RX ADMIN — MOMETASONE FUROATE AND FORMOTEROL FUMARATE DIHYDRATE 2 PUFF: 200; 5 AEROSOL RESPIRATORY (INHALATION) at 20:44

## 2017-10-10 RX ADMIN — METHYLPREDNISOLONE SODIUM SUCCINATE 40 MG: 40 INJECTION, POWDER, FOR SOLUTION INTRAMUSCULAR; INTRAVENOUS at 06:37

## 2017-10-10 RX ADMIN — HEPARIN SODIUM 5000 UNITS: 5000 INJECTION, SOLUTION INTRAVENOUS; SUBCUTANEOUS at 21:35

## 2017-10-10 RX ADMIN — SODIUM CHLORIDE: 9 INJECTION, SOLUTION INTRAVENOUS at 06:37

## 2017-10-10 ASSESSMENT — PAIN DESCRIPTION - PAIN TYPE
TYPE: CHRONIC PAIN
TYPE: CHRONIC PAIN

## 2017-10-10 ASSESSMENT — PAIN SCALES - GENERAL
PAINLEVEL_OUTOF10: 5
PAINLEVEL_OUTOF10: 5
PAINLEVEL_OUTOF10: 0

## 2017-10-10 ASSESSMENT — PAIN DESCRIPTION - LOCATION
LOCATION: BACK
LOCATION: BACK

## 2017-10-10 ASSESSMENT — PAIN DESCRIPTION - ORIENTATION
ORIENTATION: LOWER
ORIENTATION: LOWER

## 2017-10-10 NOTE — PROGRESS NOTES
Pulmonary Critical Care Progress Note  Tanner Mai CNP / Dr. Sudhir Batista M.D. Patient seen for the follow up of Hypercarbic respiratory failure/ CO2 narcosis     Subjective:  She has continued complaints of back pain. Her shortness of breath is mild. She denies any significant cough today. Denies chest pain. She slept fairly well, did not wear BiPAP last night. Examination:  Vitals: BP (!) 108/49   Pulse 98   Temp 97.9 °F (36.6 °C) (Temporal)   Resp 16   Ht 5' 2\" (1.575 m)   Wt 78 lb 14.8 oz (35.8 kg)   SpO2 99%   BMI 14.44 kg/m²     General appearance: alert and cooperative with exam, no acute distress  Neck: No JVD  Lungs: Decreased air exchange, mild rhonchi  Heart: regular rate and rhythm, S1, S2 normal, no gallop  Abdomen: Soft, non tender, + BS  Extremities: no cyanosis or clubbing.  No edema    LABs:  CBC:   Recent Labs      10/09/17   0518  10/10/17   0525   WBC  13.0*  11.6*   HGB  12.9  11.8*   HCT  39.9  35.9*   PLT  234  185     BMP:   Recent Labs      10/09/17   0518  10/10/17   0525   NA  145*  142   K  5.4*  5.3   CO2  30  31   BUN  40*  39*   CREATININE  1.66*  1.19*   LABGLOM  29*  43*   GLUCOSE  200*  151*     LIVER PROFILE:  Recent Labs      10/08/17   1845   AST  549*   ALT  503*   LABALBU  3.3*     Radiology:      Impression:  · Acute hypercarbic respiratory failure  · Acute exacerbation of COPD/Severe emphysema  · Acute Tracheo-bronchitis  · Atelectasis  · Pulmonary hypertension  · Hypotension/dehydration  · Acute kidney injury  · Back pain status post fall  · History of 40-pack-year smoking    Recommendations:  · Continue IV Levaquin  · IV solu-medrol, decrease dose and frequency  · Albuterol and Ipratropium Q 4 hours and prn  · Dulera 200  · Levophed is off  · IV fluids  · Echocardiogram reviewed  · Labs: CBC and BMP in am  · BiPAP as needed  · 2 liters/min via nasal cannula  · Incentive spirometry every hour while awake  · DVT prophylaxis with subcutaneous heparin  · Will follow with you    Electronically signed by     Luh Nobles CNP on 10/10/2017 at 11:43 AM  Patient was seen under the supervision of Dr. Tiesha Reagan and Sleep Medicine,    [facility] AT [place]: [phone]

## 2017-10-10 NOTE — PROGRESS NOTES
[address] [place] [address] Assessment complete. Acute on chronic respiratory failure ([facility] [address] [J96.20] . Vitals:    10/10/17 1200   BP:    Pulse:    Resp: 16   Temp: 98.6 °F (37 °C)   SpO2: 99%   . Patients home meds are   Prior to Admission medications    Medication Sig Start Date End Date Taking? Authorizing Provider   lisinopril (PRINIVIL;ZESTRIL) 20 MG tablet Take 20 mg by mouth daily   Yes Historical Provider, MD   potassium chloride (KLOR-CON M) 10 MEQ extended release tablet Take 10 mEq by mouth daily   Yes Historical Provider, MD   brimonidine (ALPHAGAN P) 0.1 % SOLN Place 1 drop into both eyes 2 times daily   Yes Historical Provider, MD   cephALEXin (KEFLEX) 500 MG capsule Take 1 capsule by mouth 3 times daily 6/2/17  Yes Renate Quach MD   docusate sodium (COLACE, DULCOLAX) 100 MG CAPS Take 100 mg by mouth 2 times daily  Patient taking differently: Take 100 mg by mouth daily as needed  9/28/15  Yes Rafal Claire MD   Multiple Vitamins-Minerals (THERAPEUTIC MULTIVITAMIN-MINERALS) tablet Take 1 tablet by mouth daily   Yes Historical Provider, MD   furosemide (LASIX) 20 MG tablet Take 20 mg by mouth daily   Yes Historical Provider, MD   simvastatin (ZOCOR) 40 MG tablet Take 40 mg by mouth nightly. Yes Historical Provider, MD   metoprolol (LOPRESSOR) 25 MG tablet Take 25 mg by mouth 2 times daily. Yes Historical Provider, MD   Calcium Carbonate-Vitamin D (CALCIUM + D) 600-200 MG-UNIT TABS Take  by mouth daily. Yes Historical Provider, MD   aspirin 81 MG tablet Take 81 mg by mouth three times a week    Yes Historical Provider, MD   albuterol (ACCUNEB) 0.63 MG/3ML nebulizer solution Take 1 ampule by nebulization every 6 hours as needed.       Historical Provider, MD   .  Recent Surgical History: None = 0     Assessment     Peak Flow (asthma only)    Predicted:   Personal Best:   PEF unable  % Predicted   Peak Flow :     FEV1/FVC    FEV1 Predicted       FEV1 unable weak    FEV1 % Predicted   FVC   IS volume IBW   FIO2% 1lpm  SPO2 98%  RR 22  Breath Sounds: Diminished      · Bronchodilator assessment at level  3  · Hyperinflation assessment at level   · Secretion Management assessment at level    ·   · []    Bronchodilator Assessment  BRONCHODILATOR ASSESSMENT SCORE  Score 0 1 2 3 4 5   Breath Sounds   []  Patient Baseline []  No Wheeze good aeration []  Faint, scattered wheezing, good aeration [x]  Expiratory Wheezing and or moderately diminished []  Insp/Exp wheeze and/or very diminished []  Insp/Exp and/ or marked distress   Respiratory Rate   []  Patient Baseline []  Less than 20 []  Less than 20 [x]  20-25 []  Greater than 25 []  Greater than 25   Peak flow % of Pred or PB [x]  NA   []  Greater than 90%  []  81-90% []  71-80% []  Less than or equal to 70%  or unable to perform []  Unable due to Respiratory Distress   Dyspnea re []  Patient Baseline []  No SOB []  No SOB [x]  SOB on exertion []  SOB min activity []  At rest/acute   e FEV% Predicted       []  NA []  Above 69%  []  Unable []  Above 60-69%  []  Unable []  Above 50-59%  [x]  Unable []  Above 35-49%  []  Unable []  Less than 35%  []  Unable                 []  Hyperinflation Assessment  Score 1 2 3   CXR and Breath Sounds   []  Clear []  No atelectasis  Basilar aeration []  Atelectasis or absent basilar breath sounds   Incentive Spirometry Volume  (Per IBW)   []  Greater than or equal to 15ml/Kg []  less than 15ml/Kg []  less than 15ml/Kg   Surgery within last 2 weeks []  None or general   []  Abdominal or thoracic surgery  []  Abdominal or thoracic   Chronic Pulmonary Historyre []  No []  Yes []  Yes     []  Secretion Management Assessment  Score 1 2 3   Bilateral Breath Sounds   []  Occasional Rhonchi []  Scattered Rhonchi []  Course Rhonchi and/or poor aeration   Sputum    []  Small amount of thin secretions []  Moderate amount of viscous secretions []  Copius, Viscious Yellow/ Secretions   CXR as reported by physician []  clear  []  Unavailable

## 2017-10-10 NOTE — PROGRESS NOTES
Physical Therapy  Facility/Department: [facility] CV[facility]  Daily Treatment Note  NAME: Jeane Chand  : 11/10/1925  MRN: [id number]    Date of Service: 10/10/2017  RN reports patient is medically stable for therapy treatment this date. Chart reviewed prior to treatment and patient is agreeable for therapy.         Patient Diagnosis(es):   Patient Active Problem List    Diagnosis Date Noted    Protein-calorie malnutrition, severe ([address] 10/09/2017    Pulmonary hypertension 10/09/2017    Non-rheumatic tricuspid valve insufficiency 10/09/2017    Acute on chronic respiratory failure with hypercapnia ([address] 10/08/2017    CO2 narcosis 10/08/2017    Elevated troponin 10/08/2017    Hypotension 10/08/2017    Chronic obstructive pulmonary disease with acute exacerbation (HCC) 10/08/2017    Pulmonary emphysema ([address] 2015    Sternal fracture 2015    L1 vertebral fracture ([address] 2015    Pulmonary fibrosis ([address] 2014    Malnutrition of moderate degree ([address] 2014    COPD exacerbation ([address] 2014    Hypoxemia requiring supplemental oxygen 2014    Hyperglycemia 2014       Past Medical History:   Diagnosis Date    CAD (coronary artery disease)     Cancer ([address]     Colon polyp     COPD (chronic obstructive pulmonary disease) ([address]     Hypertension     Hyperthyroidism     Pelvic fracture ([address]     Unspecified cerebral artery occlusion with cerebral infarction      Past Surgical History:   Procedure Laterality Date    BREAST SURGERY      COLONOSCOPY      CORONARY ANGIOPLASTY WITH STENT PLACEMENT      HYSTERECTOMY      JOINT REPLACEMENT      THYROIDECTOMY N/A     partial     VASCULAR SURGERY         Restrictions  Restrictions/Precautions  Restrictions/Precautions: General Precautions, Fall Risk  Required Braces or Orthoses?: No  Position Activity Restriction  Other position/activity restrictions: up with assistance  Subjective   General  Chart Reviewed: Yes  Response To Previous Treatment: Not applicable  Family / Caregiver Present: Yes (Son and daughter in law)  Subjective  Subjective: RNKorin  General Comment  Comments: Pt agreeable to PT  Pain Screening  Patient Currently in Pain: Yes  Pain Assessment  Pain Assessment: 0-10  Pain Level: 5  Pain Type: Chronic pain  Pain Location: Back  Pain Orientation: Lower  Vital Signs  BP Location: Left upper arm  Level of Consciousness: Alert  Patient Currently in Pain: Yes  Oxygen Therapy  SpO2: 99 %  Pulse Oximeter Device Mode: Continuous  Pulse Oximeter Device Location: Finger;Right  O2 Device: Nasal cannula  O2 Flow Rate (L/min): 2 L/min       Orientation  Orientation  Overall Orientation Status: Within Normal Limits  Objective    Exercises    LAQ, Hip Abd/Add, Heel/Toe-raises, HS curls, Hip flexion  Reps:12  UE Ex's:  Bicep curl, Shoulder Rows, Tricep curl, Punches, Chest pulls  Reps: 10        Transfers  Sit to Stand: Minimal Assistance  Stand to sit: Minimal Assistance  Stand Pivot Transfers: Minimal Assistance  Ambulation  Ambulation?: Yes  Ambulation 1  Surface: level tile  Device: Rolling Walker  Assistance: Minimal assistance  Quality of Gait: short & shuffle steps with flexed posture  Distance: 12ft, sat to EOB after gait. Stairs/Curb  Stairs?: No    Bed mobility  Rolling to Left: Minimal assistance  Rolling to Right: Minimal assistance  Sit to Supine: Minimal assistance  Scooting: Moderate assistance  Comment: pt needed extended time to scoot to [facility] to be comfortable in bed  Balance  Posture: Fair (Khyphotic,frail)  Sitting - Static: Good  Sitting - Dynamic: Good  Standing - Static: Fair;+  Standing - Dynamic: Fair                       Assessment   Body structures, Functions, Activity limitations: Decreased strength;Decreased endurance;Decreased functional mobility ; Decreased balance  Pt requires continued PT & is appropriate to D/C to [address] at this time to improve strength, functional

## 2017-10-10 NOTE — PLAN OF CARE
Problem: Nutrition  Goal: Optimal nutrition therapy  Nutrition Problem: Severe malnutrition, in context of chronic illness  Intervention: Food and/or Nutrient Delivery: Modify current diet, Start ONS  Nutritional Goals: PO intake to meet >75% of estimated kcal/protein needs, with good GI tolerance / managment of renal labs  Outcome: Ongoing

## 2017-10-10 NOTE — PROGRESS NOTES
PCP: Dank Young MD  10/10/2017  1:19 PM    Admitting Diagnosis:  CO2 narcosis    Problem List:  Active Hospital Problems    Diagnosis Date Noted    Malnutrition ([address] Maddie Luna 10/09/2017    Pulmonary hypertension [I27.20] 10/09/2017    Non-rheumatic tricuspid valve insufficiency [I36.1] 10/09/2017    Acute on chronic respiratory failure with hypercapnia (HCC) [J96.22] 10/08/2017    CO2 narcosis [R06.89] 10/08/2017    Elevated troponin [R74.8] 10/08/2017    Hypotension [I95.9] 10/08/2017    Chronic obstructive pulmonary disease with acute exacerbation (HCC) [J44.1] 10/08/2017       Chief Complaint: unresponsiveness    Subjective: above resolved. No new complaints. Allergies:   Allergies   Allergen Reactions    Augmentin [Amoxicillin-Pot Clavulanate] Other (See Comments)     unknown       Current Medications:    methylPREDNISolone sodium (SOLU-MEDROL) injection 30 mg Q8H   aspirin EC tablet 81 mg Daily   docusate sodium (COLACE) capsule 100 mg BID   therapeutic multivitamin-minerals 1 tablet Daily   ondansetron (ZOFRAN) tablet 4 mg Q8H PRN   insulin lispro (HUMALOG) injection vial 0-12 Units TID WC   insulin lispro (HUMALOG) injection vial 0-6 Units Nightly   albuterol (PROVENTIL) nebulizer solution 2.5 mg As Directed RT PRN   [START ON 10/11/2017] levofloxacin (LEVAQUIN) 250 MG/50ML infusion 250 mg Q48H   Calcium Carb-Cholecalciferol 600-400 MG-UNIT TABS 1 tablet Daily   norepinephrine (LEVOPHED) 16 mg in dextrose 5 % 250 mL infusion Continuous   pneumococcal 13-valent conjugate (PREVNAR) injection 0.5 mL Once   influenza quadrivalent split vaccine (FLUZONE;FLUARIX;FLULAVAL;AFLURIA) injection 0.5 mL Once   glucose (GLUTOSE) 40 % oral gel 15 g PRN   dextrose 50 % solution 12.5 g PRN   glucagon (rDNA) injection 1 mg PRN   dextrose 5 % solution PRN   vancomycin (VANCOCIN) intermittent dosing (placeholder) RX Placeholder   ipratropium-albuterol (DUONEB) nebulizer solution 1 ampule Q4H While awake mometasone-formoterol (DULERA) 200-5 MCG/ACT inhaler 2 puff BID   heparin (porcine) injection 5,000 Units BID   0.9 % sodium chloride infusion Continuous       Physical Examination:  BP (!) 108/49   Pulse 98   Temp 98.6 °F (37 °C) (Temporal)   Resp 16   Ht 5' 2\" (1.575 m)   Wt 78 lb 14.8 oz (35.8 kg)   SpO2 99%   BMI 14.44 kg/m²   General appearance - in no distress  Mental status - alert, oriented to person, place, and time  Eyes - pupils equal and reactive, extraocular eye movements intact  Nose - normal and patent, no erythema. Mouth - mucous membranes moist.  Neck - supple, no significant adenopathy  Chest - diminished breath sounds both lung fields,, mild rhonchi. Heart - sinus tach, normal S1, S2, no murmurs, rubs, clicks or gallops  Abdomen - soft, nontender, nondistended, no masses or organomegaly  Neurological - alert, oriented, normal speech, no focal findings. Musculoskeletal - no joint tenderness. Extremities - peripheral pulses normal, no pedal edema, negative Kaylie's. Skin - normal coloration and turgor, no rashes.     Labs:  Hematology:  Recent Labs      10/08/17   1845  10/09/17   0518  10/10/17   0525   WBC  12.4*  13.0*  11.6*   HGB  14.1  12.9  11.8*   HCT  43.5  39.9  35.9*   PLT  227  234  185     Chemistry:  Recent Labs      10/08/17   1845  10/09/17   0518  10/10/17   0525   NA  143  145*  142   K  5.2  5.4*  5.3   CL  100  105  104   CO2  28  30  31   GLUCOSE  166*  200*  151*   BUN  32*  40*  39*   CREATININE  1.51*  1.66*  1.19*   MG  2.7*   --    --    CALCIUM  9.0  8.5*  8.8     Recent Labs      10/08/17   1845  10/08/17   2145  10/08/17   2214  10/09/17   0042   PROT  6.7   --    --    --    LABALBU  3.3*   --    --    --    AST  549*   --    --    --    ALT  503*   --    --    --    ALKPHOS  92   --    --    --    BILITOT  0.74   --    --    --    AMMONIA   --    --   29   --    AMYLASE  90   --    --    --    LIPASE  58   --    --    --    CKTOTAL  61  103   --   143 CKMB  5.0  12.6*   --   16.9*         Plan:  Continuing current regimen.   Follow-up blood work (gasses, liver enzymes) in AM    Electronically signed by Wai Bertrand MD on 10/10/2017 at 1:19 PM

## 2017-10-10 NOTE — PROGRESS NOTES
Pharmacy Accuracy Service Medication History Note    The patient's list of current home medications has been reviewed. The patient's allergy list has been reviewed and updated. Source(s) of information: Jose Antonio Cool ([phone])    Based on information provided by the above source(s), I have updated the patient's home med list as described below. Please review the ACTION REQUESTED BY PHYSICIAN section of this note below for any discrepancies on current hospital orders. I changed or updated the following medications on the patient's home medication list:  Discontinued · Aldactone 50mg daily - no RX, pt doesn't recognize     Added · Alphagan 0.1% 1 gtt OU BID  · KCL 10meq daily     Adjusted   · ASA 81mg from daily to three times a week (M-W-F)  · Colace 100mg from bid to daily prn   Other Notes · none         PHYSICIAN ACTION REQUESTED  Discrepancies on current hospital orders that need to be addressed by a physician:    Medication Action Requested   Alphagan  KCL       Please review/reconcile and order as appropriate. Please feel free to call me with any questions about this encounter. Thank you.     Costa Ramírez PharmD  Pharmacy Medication Accuracy Review Service  Phone:  [phone]  Fax: [phone]      Electronically signed by Costa Ramírez, [address] [place] [address] on 10/10/2017 at 11:06 AM

## 2017-10-10 NOTE — PLAN OF CARE
Problem: Falls - Risk of  Goal: Absence of falls  Outcome: Ongoing  Pt remains free of falls, will continue to monitor

## 2017-10-10 NOTE — CARE COORDINATION
Social work:  Writer spoke with Samantha Barlow at [facility], stated precert has been initiated. Samantha Barlow also inquired about bipap settings and if pt will be coming on IV medications. Call placed to Esa Mcgrath, stated likely switch to PO and will have bipap setting closer to dc.

## 2017-10-10 NOTE — PROGRESS NOTES
Occupational Therapy  DATE: 10/10/2017    NAME: Tracie Casey  MRN: [id number]   : 11/10/1925     10/10/17 1228   Restrictions/Precautions   Restrictions/Precautions General Precautions; Fall Risk   Required Braces or Orthoses? No   Position Activity Restriction   Other position/activity restrictions up with assistance   General   Chart Reviewed Yes   Patient assessed for rehabilitation services? Yes   Response to previous treatment Patient with no complaints from previous session   Family / Caregiver Present No   Pain Assessment   Patient Currently in Pain Yes   Pain Assessment 0-10   Pain Level 5   Pain Type Chronic pain   Pain Location Back   Pain Orientation Lower   Pain Intervention(s) Repositioned; Emotional support   Response to Pain Intervention Patient Satisfied   Oxygen Therapy   O2 Device Nasal cannula   O2 Flow Rate (L/min) 2 L/min   Attendance   Participation Active participation   ADL   Grooming Stand by assistance;Setup  (Combing hair)   Balance   Sitting Balance Stand by assistance   Standing Balance Minimal assistance   Standing Balance   Sit to stand Minimal assistance   Stand to sit Minimal assistance   Comment From EOB   Functional Mobility   Functional - Mobility Device No device   Bed mobility   Supine to Sit Minimal assistance   Transfers   Stand Step Transfers Minimal assistance   Transfer Comments Face to face stand step transfer from bed to chair, VC for safety   Perception   Overall Perceptual Status WFL   Patient Goals    Patient goals  to go home when able; family would like pt to go to SNF and then look for AL   Short term goals   Time Frame for Short term goals by discharge, pt will   Short term goal 1 demo CGA with ADL transfers with min cues for good safety   Short term goal 2 demo CGA with functional mob in room for ADL completion with min cues for safety   Short term goal 3 demo min A with toileting routine   Short term goal 4 demo min A UB/LB ADLs following set up at approp level provided. Upon writer exit, call light within reach, pt retired to chair. All lines intact and patient positioned comfortably. All patient needs addressed prior to ending therapy session. Chart reviewed prior to treatment and patient is agreeable for therapy. RN reports patient is medically stable for therapy treatment this date. Patient would benefit from SNF for continued occupational therapy to increase independence with  ADL of bathing, dressing, toileting and grooming. Writer recommending SNF placement for for activity tolerance and strength which will increase independence with ADL's coordinated with bed mobility and chair transfers. Continued skilled OT services to address decreased safety awareness with ADL and IADL tasks and for education and increased independence with DME and AE for fall prevention and ec/ws techniques prior to d/c home.   Ronan RYDER

## 2017-10-11 LAB
ABSOLUTE EOS #: 0 K/UL (ref 0–0.4)
ABSOLUTE LYMPH #: 0.1 K/UL (ref 1–4.8)
ABSOLUTE MONO #: 0.2 K/UL (ref 0.2–0.8)
ALBUMIN SERPL-MCNC: 3.1 G/DL (ref 3.5–5.2)
ALBUMIN/GLOBULIN RATIO: ABNORMAL (ref 1–2.5)
ALP BLD-CCNC: 68 U/L (ref 35–104)
ALT SERPL-CCNC: 240 U/L (ref 5–33)
ANION GAP SERPL CALCULATED.3IONS-SCNC: 8 MMOL/L (ref 9–17)
AST SERPL-CCNC: 81 U/L
BASOPHILS # BLD: 0 %
BASOPHILS ABSOLUTE: 0 K/UL (ref 0–0.2)
BILIRUB SERPL-MCNC: 0.28 MG/DL (ref 0.3–1.2)
BILIRUBIN DIRECT: <0.08 MG/DL
BILIRUBIN, INDIRECT: ABNORMAL MG/DL (ref 0–1)
BUN BLDV-MCNC: 32 MG/DL (ref 8–23)
BUN/CREAT BLD: 35 (ref 9–20)
CA 19-9: 121 U/ML (ref 0–35)
CALCIUM SERPL-MCNC: 9 MG/DL (ref 8.6–10.4)
CARCINOEMBRYONIC ANTIGEN: 5.4 NG/ML
CHLORIDE BLD-SCNC: 104 MMOL/L (ref 98–107)
CO2: 30 MMOL/L (ref 20–31)
CREAT SERPL-MCNC: 0.91 MG/DL (ref 0.5–0.9)
DIFFERENTIAL TYPE: ABNORMAL
EOSINOPHILS RELATIVE PERCENT: 0 %
FIO2: 28
GFR AFRICAN AMERICAN: >60 ML/MIN
GFR NON-AFRICAN AMERICAN: 58 ML/MIN
GFR SERPL CREATININE-BSD FRML MDRD: ABNORMAL ML/MIN/{1.73_M2}
GFR SERPL CREATININE-BSD FRML MDRD: ABNORMAL ML/MIN/{1.73_M2}
GLOBULIN: ABNORMAL G/DL (ref 1.5–3.8)
GLUCOSE BLD-MCNC: 101 MG/DL (ref 65–105)
GLUCOSE BLD-MCNC: 123 MG/DL (ref 65–105)
GLUCOSE BLD-MCNC: 127 MG/DL (ref 70–99)
GLUCOSE BLD-MCNC: 167 MG/DL (ref 65–105)
GLUCOSE BLD-MCNC: 94 MG/DL (ref 65–105)
HAV IGM SER IA-ACNC: NONREACTIVE
HCT VFR BLD CALC: 37 % (ref 36–46)
HEMOGLOBIN: 12.2 G/DL (ref 12–16)
HEPATITIS B CORE IGM ANTIBODY: NONREACTIVE
HEPATITIS B SURFACE ANTIGEN: NONREACTIVE
HEPATITIS C ANTIBODY: NONREACTIVE
LACTIC ACID: 1.7 MMOL/L (ref 0.5–2.2)
LACTIC ACID: 1.8 MMOL/L (ref 0.5–2.2)
LACTIC ACID: 3 MMOL/L (ref 0.5–2.2)
LACTIC ACID: 5.1 MMOL/L (ref 0.5–2.2)
LYMPHOCYTES # BLD: 1 %
MAGNESIUM: 2.1 MG/DL (ref 1.6–2.6)
MCH RBC QN AUTO: 30.9 PG (ref 26–34)
MCHC RBC AUTO-ENTMCNC: 32.9 G/DL (ref 31–37)
MCV RBC AUTO: 94 FL (ref 80–100)
MONOCYTES # BLD: 2 %
NEGATIVE BASE EXCESS, ART: ABNORMAL (ref 0–2)
O2 DEVICE/FLOW/%: ABNORMAL
PATIENT TEMP: ABNORMAL
PDW BLD-RTO: 14.2 % (ref 11.5–14.5)
PLATELET # BLD: 183 K/UL (ref 130–400)
PLATELET ESTIMATE: ABNORMAL
PMV BLD AUTO: 8.4 FL (ref 6–12)
POC HCO3: 27.3 MMOL/L (ref 22–27)
POC O2 SATURATION: 98 %
POC PCO2 TEMP: ABNORMAL MM HG
POC PCO2: 45 MM HG (ref 32–45)
POC PH TEMP: ABNORMAL
POC PH: 7.4 (ref 7.35–7.45)
POC PO2 TEMP: ABNORMAL MM HG
POC PO2: 99 MM HG (ref 75–95)
POSITIVE BASE EXCESS, ART: 2 (ref 0–2)
POTASSIUM SERPL-SCNC: 5.1 MMOL/L (ref 3.7–5.3)
RBC # BLD: 3.93 M/UL (ref 4–5.2)
RBC # BLD: ABNORMAL 10*6/UL
SEG NEUTROPHILS: 97 %
SEGMENTED NEUTROPHILS ABSOLUTE COUNT: 10.4 K/UL (ref 1.8–7.7)
SODIUM BLD-SCNC: 142 MMOL/L (ref 135–144)
TCO2 (CALC), ART: 29 MMOL/L (ref 23–28)
THYROXINE, FREE: 1.19 NG/DL (ref 0.93–1.7)
TOTAL PROTEIN: 5.5 G/DL (ref 6.4–8.3)
TSH SERPL DL<=0.05 MIU/L-ACNC: 5.57 MIU/L (ref 0.3–5)
VANCOMYCIN TROUGH DATE LAST DOSE: ABNORMAL
VANCOMYCIN TROUGH DOSE AMOUNT: ABNORMAL
VANCOMYCIN TROUGH TIME LAST DOSE: ABNORMAL
VANCOMYCIN TROUGH: <4 UG/ML (ref 10–20)
WBC # BLD: 10.7 K/UL (ref 3.5–11)
WBC # BLD: ABNORMAL 10*3/UL

## 2017-10-11 PROCEDURE — 36415 COLL VENOUS BLD VENIPUNCTURE: CPT

## 2017-10-11 PROCEDURE — 94761 N-INVAS EAR/PLS OXIMETRY MLT: CPT

## 2017-10-11 PROCEDURE — 97116 GAIT TRAINING THERAPY: CPT

## 2017-10-11 PROCEDURE — 2700000000 HC OXYGEN THERAPY PER DAY

## 2017-10-11 PROCEDURE — 80076 HEPATIC FUNCTION PANEL: CPT

## 2017-10-11 PROCEDURE — 84439 ASSAY OF FREE THYROXINE: CPT

## 2017-10-11 PROCEDURE — 80048 BASIC METABOLIC PNL TOTAL CA: CPT

## 2017-10-11 PROCEDURE — 84443 ASSAY THYROID STIM HORMONE: CPT

## 2017-10-11 PROCEDURE — 97530 THERAPEUTIC ACTIVITIES: CPT | Performed by: NURSE PRACTITIONER

## 2017-10-11 PROCEDURE — 6360000002 HC RX W HCPCS: Performed by: INTERNAL MEDICINE

## 2017-10-11 PROCEDURE — 6370000000 HC RX 637 (ALT 250 FOR IP): Performed by: INTERNAL MEDICINE

## 2017-10-11 PROCEDURE — 2060000000 HC ICU INTERMEDIATE R&B

## 2017-10-11 PROCEDURE — 97110 THERAPEUTIC EXERCISES: CPT

## 2017-10-11 PROCEDURE — 83605 ASSAY OF LACTIC ACID: CPT

## 2017-10-11 PROCEDURE — 6360000002 HC RX W HCPCS: Performed by: NURSE PRACTITIONER

## 2017-10-11 PROCEDURE — 83735 ASSAY OF MAGNESIUM: CPT

## 2017-10-11 PROCEDURE — 97535 SELF CARE MNGMENT TRAINING: CPT | Performed by: NURSE PRACTITIONER

## 2017-10-11 PROCEDURE — 82947 ASSAY GLUCOSE BLOOD QUANT: CPT

## 2017-10-11 PROCEDURE — 94640 AIRWAY INHALATION TREATMENT: CPT

## 2017-10-11 PROCEDURE — 80202 ASSAY OF VANCOMYCIN: CPT

## 2017-10-11 PROCEDURE — 82378 CARCINOEMBRYONIC ANTIGEN: CPT

## 2017-10-11 PROCEDURE — 94660 CPAP INITIATION&MGMT: CPT

## 2017-10-11 PROCEDURE — 85025 COMPLETE CBC W/AUTO DIFF WBC: CPT

## 2017-10-11 PROCEDURE — 86301 IMMUNOASSAY TUMOR CA 19-9: CPT

## 2017-10-11 PROCEDURE — 2580000003 HC RX 258: Performed by: INTERNAL MEDICINE

## 2017-10-11 PROCEDURE — 80074 ACUTE HEPATITIS PANEL: CPT

## 2017-10-11 RX ORDER — BRIMONIDINE TARTRATE 2 MG/ML
1 SOLUTION/ DROPS OPHTHALMIC 2 TIMES DAILY
Status: DISCONTINUED | OUTPATIENT
Start: 2017-10-11 | End: 2017-10-13 | Stop reason: HOSPADM

## 2017-10-11 RX ORDER — HYDROCODONE BITARTRATE AND ACETAMINOPHEN 5; 325 MG/1; MG/1
1 TABLET ORAL EVERY 6 HOURS PRN
Status: DISCONTINUED | OUTPATIENT
Start: 2017-10-11 | End: 2017-10-13 | Stop reason: HOSPADM

## 2017-10-11 RX ORDER — METHYLPREDNISOLONE SODIUM SUCCINATE 40 MG/ML
30 INJECTION, POWDER, LYOPHILIZED, FOR SOLUTION INTRAMUSCULAR; INTRAVENOUS EVERY 12 HOURS
Status: DISCONTINUED | OUTPATIENT
Start: 2017-10-11 | End: 2017-10-12

## 2017-10-11 RX ORDER — LEVOFLOXACIN 5 MG/ML
250 INJECTION, SOLUTION INTRAVENOUS EVERY 24 HOURS
Status: DISCONTINUED | OUTPATIENT
Start: 2017-10-12 | End: 2017-10-13 | Stop reason: HOSPADM

## 2017-10-11 RX ADMIN — DOCUSATE SODIUM 100 MG: 100 CAPSULE, LIQUID FILLED ORAL at 21:13

## 2017-10-11 RX ADMIN — IPRATROPIUM BROMIDE AND ALBUTEROL SULFATE 1 AMPULE: .5; 3 SOLUTION RESPIRATORY (INHALATION) at 07:47

## 2017-10-11 RX ADMIN — Medication 1 TABLET: at 09:45

## 2017-10-11 RX ADMIN — HYDROCODONE BITARTRATE AND ACETAMINOPHEN 1 TABLET: 5; 325 TABLET ORAL at 11:40

## 2017-10-11 RX ADMIN — BRIMONIDINE TARTRATE 1 DROP: 2 SOLUTION OPHTHALMIC at 10:04

## 2017-10-11 RX ADMIN — LEVOFLOXACIN 250 MG: 5 INJECTION, SOLUTION INTRAVENOUS at 02:21

## 2017-10-11 RX ADMIN — VANCOMYCIN HYDROCHLORIDE 500 MG: 500 INJECTION, POWDER, LYOPHILIZED, FOR SOLUTION INTRAVENOUS at 05:27

## 2017-10-11 RX ADMIN — INSULIN LISPRO 2 UNITS: 100 INJECTION, SOLUTION INTRAVENOUS; SUBCUTANEOUS at 11:47

## 2017-10-11 RX ADMIN — SODIUM CHLORIDE: 9 INJECTION, SOLUTION INTRAVENOUS at 15:40

## 2017-10-11 RX ADMIN — HEPARIN SODIUM 5000 UNITS: 5000 INJECTION, SOLUTION INTRAVENOUS; SUBCUTANEOUS at 09:45

## 2017-10-11 RX ADMIN — ASPIRIN 81 MG: 81 TABLET, COATED ORAL at 09:45

## 2017-10-11 RX ADMIN — IPRATROPIUM BROMIDE AND ALBUTEROL SULFATE 1 AMPULE: .5; 3 SOLUTION RESPIRATORY (INHALATION) at 15:26

## 2017-10-11 RX ADMIN — IPRATROPIUM BROMIDE AND ALBUTEROL SULFATE 1 AMPULE: .5; 3 SOLUTION RESPIRATORY (INHALATION) at 19:49

## 2017-10-11 RX ADMIN — HEPARIN SODIUM 5000 UNITS: 5000 INJECTION, SOLUTION INTRAVENOUS; SUBCUTANEOUS at 21:13

## 2017-10-11 RX ADMIN — DOCUSATE SODIUM 100 MG: 100 CAPSULE, LIQUID FILLED ORAL at 09:45

## 2017-10-11 RX ADMIN — MOMETASONE FUROATE AND FORMOTEROL FUMARATE DIHYDRATE 2 PUFF: 200; 5 AEROSOL RESPIRATORY (INHALATION) at 07:47

## 2017-10-11 RX ADMIN — IPRATROPIUM BROMIDE AND ALBUTEROL SULFATE 1 AMPULE: .5; 3 SOLUTION RESPIRATORY (INHALATION) at 11:55

## 2017-10-11 RX ADMIN — MOMETASONE FUROATE AND FORMOTEROL FUMARATE DIHYDRATE 2 PUFF: 200; 5 AEROSOL RESPIRATORY (INHALATION) at 19:49

## 2017-10-11 RX ADMIN — METHYLPREDNISOLONE SODIUM SUCCINATE 30 MG: 40 INJECTION, POWDER, FOR SOLUTION INTRAMUSCULAR; INTRAVENOUS at 05:27

## 2017-10-11 RX ADMIN — METHYLPREDNISOLONE SODIUM SUCCINATE 30 MG: 40 INJECTION, POWDER, FOR SOLUTION INTRAMUSCULAR; INTRAVENOUS at 17:03

## 2017-10-11 ASSESSMENT — PAIN SCALES - GENERAL
PAINLEVEL_OUTOF10: 3
PAINLEVEL_OUTOF10: 2
PAINLEVEL_OUTOF10: 6

## 2017-10-11 ASSESSMENT — PAIN DESCRIPTION - PAIN TYPE
TYPE: ACUTE PAIN
TYPE: ACUTE PAIN

## 2017-10-11 ASSESSMENT — PAIN DESCRIPTION - PROGRESSION: CLINICAL_PROGRESSION: NOT CHANGED

## 2017-10-11 ASSESSMENT — PAIN DESCRIPTION - ORIENTATION
ORIENTATION: LOWER
ORIENTATION: RIGHT

## 2017-10-11 ASSESSMENT — PAIN DESCRIPTION - ONSET
ONSET: ON-GOING
ONSET: ON-GOING

## 2017-10-11 ASSESSMENT — PAIN DESCRIPTION - LOCATION
LOCATION: BACK
LOCATION: BACK

## 2017-10-11 ASSESSMENT — PAIN DESCRIPTION - FREQUENCY
FREQUENCY: CONTINUOUS
FREQUENCY: INTERMITTENT

## 2017-10-11 ASSESSMENT — PAIN DESCRIPTION - DESCRIPTORS
DESCRIPTORS: ACHING;DISCOMFORT
DESCRIPTORS: ACHING;DISCOMFORT

## 2017-10-11 NOTE — PROGRESS NOTES
Performance deficits / Impairments Decreased functional mobility ; Decreased ADL status; Decreased strength;Decreased safe awareness;Decreased cognition;Decreased balance;Decreased endurance   Prognosis Good   Patient Education OT POC, ECWS, transfer saefty   REQUIRES OT FOLLOW UP Yes   Discharge Recommendations Subacute/Skilled Nursing Facility   Activity Tolerance   Activity Tolerance Patient limited by fatigue   Activity Tolerance Pt agreeable to transfer to chair to eat breakfast and edu. Safety Devices   Safety Devices in place Yes   Type of devices Nurse notified; Left in chair;Patient at risk for falls;Gait belt;Call light within reach; All fall risk precautions in place   Restraints   Initially in place No   Other Comments   Comments [facility]:[id number]   Plan   Times per week 4-5x/week, 1-2x/day   Current Treatment Recommendations Strengthening;Balance Training;Functional Mobility Training; Endurance Training; Safety Education & Training;Self-Care / ADL; Patient/Caregiver Education & Training;Equipment Evaluation, Education, & procurement   Upon arrival, pt supine in bed. Supine>sit>stand step transfer to chair. Written and verbal edu reviewed with pt on safe ADL completion techniques and tips during bathing/showering, and toileting; EC/WS techniques of pacing, posturing, body mechanics, planning and organizing tasks, avoiding fatigue, and task simplification in regards to ADLs of eating, grooming, bathing/showering, dressing, and IADLs of cooking, meal cleanup, marketing and meal planning, laundry, bed making, and housework. Upon writer exit, call light within reach, pt retired to chair present completing AM tasks so treatment was ended. All lines intact and patient positioned comfortably. All patient needs addressed prior to ending therapy session. Chart reviewed prior to treatment and patient is agreeable for therapy. RN reports patient is medically stable for therapy treatment this date.   Patient would benefit

## 2017-10-11 NOTE — PLAN OF CARE
Problem: Nutrition  Goal: Optimal nutrition therapy  Nutrition Problem: Severe malnutrition, in context of chronic illness  Intervention: Food and/or Nutrient Delivery: Modify current diet, Start ONS  Nutritional Goals: PO intake to meet >75% of estimated kcal/protein needs, with good GI tolerance / managment of renal labs   Outcome: Ongoing  Pt educated on the importance of optimal nutrition therapy. Writer set nutrition goals with pt for each meal to help meet optimal PO intake. Pt agreeable to nutrition plan. Writer working with patient and dietary to select foods of patient choice/liking to improve nutritional intake. Writer will continue to work with and reinforce education patient throughout shift.

## 2017-10-11 NOTE — PROGRESS NOTES
Progress Note    PCP: Dank Young MD  10/11/2017  1:16 PM    Admitting Diagnosis:  CO2 narcosis    Problem List:  Active Hospital Problems    Diagnosis Date Noted    Protein-calorie malnutrition, severe ([facility] [E43] 10/09/2017    Pulmonary hypertension [I27.20] 10/09/2017    Non-rheumatic tricuspid valve insufficiency [I36.1] 10/09/2017    Acute on chronic respiratory failure with hypercapnia (HCC) [J96.22] 10/08/2017    CO2 narcosis [R06.89] 10/08/2017    Elevated troponin [R74.8] 10/08/2017    Hypotension [I95.9] 10/08/2017    Chronic obstructive pulmonary disease with acute exacerbation ([facility] [J44.1] 10/08/2017       Chief Complaint: CO2 narcosis    Subjective: above resolved. Allergies:   Allergies   Allergen Reactions    Augmentin [Amoxicillin-Pot Clavulanate] Other (See Comments)     unknown       Current Medications:    vancomycin (VANCOCIN) 500 mg in dextrose 5 % 100 mL IVPB (mini-bag) Q36H   [START ON 10/12/2017] levofloxacin (LEVAQUIN) 250 MG/50ML infusion 250 mg Q24H   brimonidine (ALPHAGAN) 0.2 % ophthalmic solution 1 drop BID   HYDROcodone-acetaminophen (NORCO) 5-325 MG per tablet 1 tablet Q6H PRN   methylPREDNISolone sodium (SOLU-MEDROL) injection 30 mg Q12H   aspirin EC tablet 81 mg Daily   docusate sodium (COLACE) capsule 100 mg BID   therapeutic multivitamin-minerals 1 tablet Daily   ondansetron (ZOFRAN) tablet 4 mg Q8H PRN   insulin lispro (HUMALOG) injection vial 0-12 Units TID WC   insulin lispro (HUMALOG) injection vial 0-6 Units Nightly   albuterol (PROVENTIL) nebulizer solution 2.5 mg As Directed RT PRN   Calcium Carb-Cholecalciferol 600-400 MG-UNIT TABS 1 tablet Daily   norepinephrine (LEVOPHED) 16 mg in dextrose 5 % 250 mL infusion Continuous   pneumococcal 13-valent conjugate (PREVNAR) injection 0.5 mL Once   influenza quadrivalent split vaccine (FLUZONE;FLUARIX;FLULAVAL;AFLURIA) injection 0.5 mL Once   glucose (GLUTOSE) 40 % oral gel 15 g PRN   dextrose 50 % solution 12.5 5.5*   LABALBU  3.3*   --    --    --   3.1*   AST  549*   --    --    --   81*   ALT  503*   --    --    --   240*   ALKPHOS  92   --    --    --   68   BILITOT  0.74   --    --    --   0.28*   BILIDIR   --    --    --    --   <0.08   AMMONIA   --    --   29   --    --    AMYLASE  90   --    --    --    --    LIPASE  58   --    --    --    --    CKTOTAL  61  103   --   143   --    CKMB  5.0  12.6*   --   16.9*   --      Follow-up ABGs: much improved from baseline, including pH. Hepatic enzymes trending down but still abnormal. CT abdomen: liver basically unremarkable (see retails in EHR)  Discussed above with patient. Plan:  Taper steroids. Order tumor markers. Stable for transfer to Progressive Unit. Recheck Mg level.     Electronically signed by Tara Walsh MD on 10/11/2017 at 1:16 PM

## 2017-10-11 NOTE — PROGRESS NOTES
transfer to stepdown unit  · Will follow with you    Electronically signed by     Srinivasan Brown MD on 10/11/2017 at 2:34 PM  Pulmonary Critical Care and Sleep Medicine,  [facility]  Cell: [phone]  Office: [phone]

## 2017-10-11 NOTE — PLAN OF CARE
Problem: Risk for Impaired Skin Integrity  Goal: Tissue integrity - skin and mucous membranes  Structural intactness and normal physiological function of skin and  mucous membranes.    Outcome: Ongoing      Problem: Falls - Risk of  Goal: Absence of falls  Outcome: Ongoing

## 2017-10-11 NOTE — FLOWSHEET NOTE
Patient sitting in bedside chair eating breakfast. There are no visitors present. Patient engages in brief conversation with writer, stating that she \"feels some better. \" She expresses no specific needs at this time. Writer offers support and  Presence, and  Patient expresses thanks. Spiritual Care will follow as needed.      10/11/17 1018   Encounter Summary   Services provided to: Patient   Referral/Consult From: Merissa   Continue Visiting (10/11/17)   Complexity of Encounter Low   Length of Encounter 15 minutes   Routine   Type Follow up   Assessment Approachable   Intervention Active listening   Outcome Engaged in conversation;Expressed gratitude

## 2017-10-12 ENCOUNTER — APPOINTMENT (OUTPATIENT)
Dept: ULTRASOUND IMAGING | Age: 82
DRG: 189 | End: 2017-10-12
Payer: MEDICARE

## 2017-10-12 ENCOUNTER — APPOINTMENT (OUTPATIENT)
Dept: GENERAL RADIOLOGY | Age: 82
DRG: 189 | End: 2017-10-12
Payer: MEDICARE

## 2017-10-12 LAB
ABSOLUTE EOS #: 0 K/UL (ref 0–0.4)
ABSOLUTE LYMPH #: 0.2 K/UL (ref 1–4.8)
ABSOLUTE MONO #: 0.2 K/UL (ref 0.2–0.8)
AMYLASE: 90 U/L (ref 28–100)
ANION GAP SERPL CALCULATED.3IONS-SCNC: 8 MMOL/L (ref 9–17)
BASOPHILS # BLD: 0 %
BASOPHILS ABSOLUTE: 0 K/UL (ref 0–0.2)
BUN BLDV-MCNC: 23 MG/DL (ref 8–23)
BUN/CREAT BLD: 31 (ref 9–20)
CALCIUM SERPL-MCNC: 8.9 MG/DL (ref 8.6–10.4)
CHLORIDE BLD-SCNC: 107 MMOL/L (ref 98–107)
CO2: 28 MMOL/L (ref 20–31)
CREAT SERPL-MCNC: 0.74 MG/DL (ref 0.5–0.9)
DIFFERENTIAL TYPE: ABNORMAL
EOSINOPHILS RELATIVE PERCENT: 0 %
GFR AFRICAN AMERICAN: >60 ML/MIN
GFR NON-AFRICAN AMERICAN: >60 ML/MIN
GFR SERPL CREATININE-BSD FRML MDRD: ABNORMAL ML/MIN/{1.73_M2}
GFR SERPL CREATININE-BSD FRML MDRD: ABNORMAL ML/MIN/{1.73_M2}
GLUCOSE BLD-MCNC: 120 MG/DL (ref 65–105)
GLUCOSE BLD-MCNC: 180 MG/DL (ref 65–105)
GLUCOSE BLD-MCNC: 83 MG/DL (ref 65–105)
GLUCOSE BLD-MCNC: 84 MG/DL (ref 70–99)
GLUCOSE BLD-MCNC: 95 MG/DL (ref 65–105)
HCT VFR BLD CALC: 40.8 % (ref 36–46)
HEMOGLOBIN: 13.5 G/DL (ref 12–16)
LACTIC ACID: 1.6 MMOL/L (ref 0.5–2.2)
LACTIC ACID: 2.4 MMOL/L (ref 0.5–2.2)
LACTIC ACID: 2.9 MMOL/L (ref 0.5–2.2)
LACTIC ACID: 3.7 MMOL/L (ref 0.5–2.2)
LIPASE: 74 U/L (ref 13–60)
LYMPHOCYTES # BLD: 3 %
MCH RBC QN AUTO: 30.8 PG (ref 26–34)
MCHC RBC AUTO-ENTMCNC: 33.2 G/DL (ref 31–37)
MCV RBC AUTO: 93 FL (ref 80–100)
MONOCYTES # BLD: 4 %
PDW BLD-RTO: 13.5 % (ref 11.5–14.5)
PLATELET # BLD: 171 K/UL (ref 130–400)
PLATELET ESTIMATE: ABNORMAL
PMV BLD AUTO: ABNORMAL FL (ref 6–12)
POTASSIUM SERPL-SCNC: 5.2 MMOL/L (ref 3.7–5.3)
RBC # BLD: 4.39 M/UL (ref 4–5.2)
RBC # BLD: ABNORMAL 10*6/UL
SEG NEUTROPHILS: 93 %
SEGMENTED NEUTROPHILS ABSOLUTE COUNT: 6.5 K/UL (ref 1.8–7.7)
SODIUM BLD-SCNC: 143 MMOL/L (ref 135–144)
WBC # BLD: 6.9 K/UL (ref 3.5–11)
WBC # BLD: ABNORMAL 10*3/UL

## 2017-10-12 PROCEDURE — 83605 ASSAY OF LACTIC ACID: CPT

## 2017-10-12 PROCEDURE — 6370000000 HC RX 637 (ALT 250 FOR IP): Performed by: INTERNAL MEDICINE

## 2017-10-12 PROCEDURE — 71010 XR CHEST PORTABLE: CPT

## 2017-10-12 PROCEDURE — 82947 ASSAY GLUCOSE BLOOD QUANT: CPT

## 2017-10-12 PROCEDURE — 36415 COLL VENOUS BLD VENIPUNCTURE: CPT

## 2017-10-12 PROCEDURE — 97116 GAIT TRAINING THERAPY: CPT

## 2017-10-12 PROCEDURE — 80048 BASIC METABOLIC PNL TOTAL CA: CPT

## 2017-10-12 PROCEDURE — 97530 THERAPEUTIC ACTIVITIES: CPT

## 2017-10-12 PROCEDURE — 6360000002 HC RX W HCPCS: Performed by: INTERNAL MEDICINE

## 2017-10-12 PROCEDURE — 94640 AIRWAY INHALATION TREATMENT: CPT

## 2017-10-12 PROCEDURE — 97110 THERAPEUTIC EXERCISES: CPT

## 2017-10-12 PROCEDURE — 2580000003 HC RX 258: Performed by: INTERNAL MEDICINE

## 2017-10-12 PROCEDURE — 83690 ASSAY OF LIPASE: CPT

## 2017-10-12 PROCEDURE — 85025 COMPLETE CBC W/AUTO DIFF WBC: CPT

## 2017-10-12 PROCEDURE — 2060000000 HC ICU INTERMEDIATE R&B

## 2017-10-12 PROCEDURE — 94761 N-INVAS EAR/PLS OXIMETRY MLT: CPT

## 2017-10-12 PROCEDURE — 76705 ECHO EXAM OF ABDOMEN: CPT

## 2017-10-12 PROCEDURE — 82150 ASSAY OF AMYLASE: CPT

## 2017-10-12 PROCEDURE — 6370000000 HC RX 637 (ALT 250 FOR IP): Performed by: NURSE PRACTITIONER

## 2017-10-12 RX ORDER — PREDNISONE 20 MG/1
20 TABLET ORAL 2 TIMES DAILY
Status: DISCONTINUED | OUTPATIENT
Start: 2017-10-12 | End: 2017-10-13

## 2017-10-12 RX ADMIN — Medication 1 TABLET: at 09:09

## 2017-10-12 RX ADMIN — PREDNISONE 20 MG: 20 TABLET ORAL at 12:54

## 2017-10-12 RX ADMIN — DOCUSATE SODIUM 100 MG: 100 CAPSULE, LIQUID FILLED ORAL at 09:04

## 2017-10-12 RX ADMIN — METHYLPREDNISOLONE SODIUM SUCCINATE 30 MG: 40 INJECTION, POWDER, FOR SOLUTION INTRAMUSCULAR; INTRAVENOUS at 05:24

## 2017-10-12 RX ADMIN — ASPIRIN 81 MG: 81 TABLET, COATED ORAL at 09:01

## 2017-10-12 RX ADMIN — PREDNISONE 20 MG: 20 TABLET ORAL at 22:30

## 2017-10-12 RX ADMIN — IPRATROPIUM BROMIDE AND ALBUTEROL SULFATE 1 AMPULE: .5; 3 SOLUTION RESPIRATORY (INHALATION) at 14:54

## 2017-10-12 RX ADMIN — VANCOMYCIN HYDROCHLORIDE 500 MG: 500 INJECTION, POWDER, LYOPHILIZED, FOR SOLUTION INTRAVENOUS at 17:45

## 2017-10-12 RX ADMIN — MOMETASONE FUROATE AND FORMOTEROL FUMARATE DIHYDRATE 2 PUFF: 200; 5 AEROSOL RESPIRATORY (INHALATION) at 19:52

## 2017-10-12 RX ADMIN — IPRATROPIUM BROMIDE AND ALBUTEROL SULFATE 1 AMPULE: .5; 3 SOLUTION RESPIRATORY (INHALATION) at 19:52

## 2017-10-12 RX ADMIN — IPRATROPIUM BROMIDE AND ALBUTEROL SULFATE 1 AMPULE: .5; 3 SOLUTION RESPIRATORY (INHALATION) at 11:23

## 2017-10-12 RX ADMIN — LEVOFLOXACIN 250 MG: 5 INJECTION, SOLUTION INTRAVENOUS at 01:49

## 2017-10-12 RX ADMIN — IPRATROPIUM BROMIDE AND ALBUTEROL SULFATE 1 AMPULE: .5; 3 SOLUTION RESPIRATORY (INHALATION) at 07:24

## 2017-10-12 RX ADMIN — BRIMONIDINE TARTRATE 1 DROP: 2 SOLUTION OPHTHALMIC at 09:01

## 2017-10-12 RX ADMIN — HEPARIN SODIUM 5000 UNITS: 5000 INJECTION, SOLUTION INTRAVENOUS; SUBCUTANEOUS at 09:02

## 2017-10-12 RX ADMIN — INSULIN LISPRO 1 UNITS: 100 INJECTION, SOLUTION INTRAVENOUS; SUBCUTANEOUS at 22:31

## 2017-10-12 RX ADMIN — Medication 1 TABLET: at 09:08

## 2017-10-12 RX ADMIN — HEPARIN SODIUM 5000 UNITS: 5000 INJECTION, SOLUTION INTRAVENOUS; SUBCUTANEOUS at 22:31

## 2017-10-12 RX ADMIN — MOMETASONE FUROATE AND FORMOTEROL FUMARATE DIHYDRATE 2 PUFF: 200; 5 AEROSOL RESPIRATORY (INHALATION) at 07:24

## 2017-10-12 RX ADMIN — DOCUSATE SODIUM 100 MG: 100 CAPSULE, LIQUID FILLED ORAL at 22:34

## 2017-10-12 ASSESSMENT — PAIN DESCRIPTION - PAIN TYPE
TYPE: ACUTE PAIN
TYPE: ACUTE PAIN

## 2017-10-12 ASSESSMENT — PAIN SCALES - GENERAL
PAINLEVEL_OUTOF10: 2
PAINLEVEL_OUTOF10: 0
PAINLEVEL_OUTOF10: 2

## 2017-10-12 ASSESSMENT — PAIN DESCRIPTION - ORIENTATION
ORIENTATION: LOWER
ORIENTATION: LOWER

## 2017-10-12 ASSESSMENT — PAIN DESCRIPTION - LOCATION
LOCATION: BACK
LOCATION: BACK

## 2017-10-12 NOTE — PROGRESS NOTES
Occupational Therapy  DATE: 10/12/2017    NAME: Tracie Casey  MRN: [id number]   : 11/10/1925       10/12/17 1340   Restrictions/Precautions   Restrictions/Precautions General Precautions; Fall Risk   Required Braces or Orthoses? No   Position Activity Restriction   Other position/activity restrictions up with assistance, [facility]   General   Chart Reviewed Yes   Patient assessed for rehabilitation services? Yes   Response to previous treatment Patient with no complaints from previous session   Family / Caregiver Present No   Subjective   Subjective \"I dont need you to teach my how to brush my teeth, I know how to do that already. \" Pt required min encouragement to get OOB but was agreeable to OT session   General Comment   Comments [id number]   Pain Assessment   Patient Currently in Pain Denies   ADL   Additional Comments pt refused all adl tasks and became slightly agitated when encouraged to participate in grooming tasks at EOB    Balance   Sitting Balance Stand by assistance   Standing Balance Contact guard assistance   Standing Balance   Time 2-3 mins   Activity static standing at EOB with support from RW   Sit to stand Contact guard assistance   Stand to sit Contact guard assistance   Comment From EOB with vc necessary for hand placement   Functional Mobility   Functional - Mobility Device Rolling Walker   Activity Other  (side steps at EOB)   Assist Level Minimal assistance   Bed mobility   Bridging Minimal assistance   Rolling to Right Minimal assistance   Supine to Sit Minimal assistance;Contact guard assistance   Sit to Supine Minimal assistance   Scooting Contact guard assistance   Comment use of bed rails and slightly elevated HOB    Transfers   Stand Step Transfers Minimal assistance   Exercises   Shoulder Flexion x   Shoulder Extension x   Shoulder ABduction x   Shoulder ADduction x   Elbow Flexion x   Elbow Extension x   Other 1x20 to tolerance seated EOB    Patient Goals    Patient goals  to go home when able; family would like pt to go to SNF and then look for AL   Short term goals   Time Frame for Short term goals by discharge, pt will   Short term goal 1 demo CGA with ADL transfers with min cues for good safety   Short term goal 2 demo CGA with functional mob in room for ADL completion with min cues for safety   Short term goal 3 demo min A with toileting routine   Short term goal 4 demo min A UB/LB ADLs following set up at approp level   Short term goal 5 verb understanding of fall prevention techs and equip needs    Assessment   Performance deficits / Impairments Decreased functional mobility ; Decreased ADL status; Decreased strength;Decreased safe awareness;Decreased cognition;Decreased balance;Decreased endurance   Prognosis Good   Patient Education purpose of OT, OT POC, pursed lip breathing techniques, transfer safety   REQUIRES OT FOLLOW UP Yes   Discharge Recommendations Subacute/Skilled Nursing Facility   Activity Tolerance   Activity Tolerance Patient limited by fatigue   Activity Tolerance pt agreeable to sit EOB to partipate in static and dynamic activities with tolerating 10-12 mins before requesting a therapeutic rb. no lob noted    Plan   Times per week 4-5x/week, 1-2x/day   Current Treatment Recommendations Strengthening;Balance Training;Functional Mobility Training; Endurance Training; Safety Education & Training;Self-Care / ADL; Patient/Caregiver Education & Training;Equipment Evaluation, Education, & procurement     Patient would benefit from SNF for continued occupational therapy to increase independence with  ADL of bathing, dressing, toileting and grooming. Writer recommending SNF placement for for activity tolerance and strength which will increase independence with ADL's coordinated with bed mobility and chair transfers.  Continued skilled OT services to address decreased safety awareness with ADL and IADL tasks and for education and increased independence with DME and AE for fall prevention and ec/ws techniques prior to d/c home.     JOÃO Raines/DEBBIE

## 2017-10-12 NOTE — PROGRESS NOTES
volume   IBW   FIO2% RA  SPO2 96%  RR 22  Breath Sounds: Diminished      · Bronchodilator assessment at level  3  · Hyperinflation assessment at level   · Secretion Management assessment at level    ·   · []    Bronchodilator Assessment  BRONCHODILATOR ASSESSMENT SCORE  Score 0 1 2 3 4 5   Breath Sounds   []  Patient Baseline []  No Wheeze good aeration []  Faint, scattered wheezing, good aeration [x]  Expiratory Wheezing and or moderately diminished []  Insp/Exp wheeze and/or very diminished []  Insp/Exp and/ or marked distress   Respiratory Rate   []  Patient Baseline []  Less than 20 []  Less than 20 [x]  20-25 []  Greater than 25 []  Greater than 25   Peak flow % of Pred or PB [x]  NA   []  Greater than 90%  []  81-90% []  71-80% []  Less than or equal to 70%  or unable to perform []  Unable due to Respiratory Distress   Dyspnea re []  Patient Baseline []  No SOB []  No SOB [x]  SOB on exertion []  SOB min activity []  At rest/acute   e FEV% Predicted       []  NA []  Above 69%  []  Unable []  Above 60-69%  []  Unable []  Above 50-59%  [x]  Unable []  Above 35-49%  []  Unable []  Less than 35%  []  Unable                 []  Hyperinflation Assessment  Score 1 2 3   CXR and Breath Sounds   []  Clear []  No atelectasis  Basilar aeration []  Atelectasis or absent basilar breath sounds   Incentive Spirometry Volume  (Per IBW)   []  Greater than or equal to 15ml/Kg []  less than 15ml/Kg []  less than 15ml/Kg   Surgery within last 2 weeks []  None or general   []  Abdominal or thoracic surgery  []  Abdominal or thoracic   Chronic Pulmonary Historyre []  No []  Yes []  Yes     []  Secretion Management Assessment  Score 1 2 3   Bilateral Breath Sounds   []  Occasional Rhonchi []  Scattered Rhonchi []  Course Rhonchi and/or poor aeration   Sputum    []  Small amount of thin secretions []  Moderate amount of viscous secretions []  Copius, Viscious Yellow/ Secretions   CXR as reported by physician

## 2017-10-12 NOTE — PROGRESS NOTES
[facility] Acute on chronic [facility] failure ([facility] [J96.20] . Vitals:    10/11/17 2003   BP:    Pulse:    Resp:    Temp:    SpO2: 100%   . Patients home meds are   Prior to Admission medications    Medication Sig Start Date End Date Taking? Authorizing Provider   lisinopril (PRINIVIL;ZESTRIL) 20 MG tablet Take 20 mg by mouth daily   Yes Historical Provider, MD   potassium chloride (KLOR-CON M) 10 MEQ extended release tablet Take 10 mEq by mouth daily   Yes Historical Provider, MD   brimonidine (ALPHAGAN P) 0.1 % SOLN Place 1 drop into both eyes 2 times daily   Yes Historical Provider, MD   cephALEXin (KEFLEX) 500 MG capsule Take 1 capsule by mouth 3 times daily 6/2/17  Yes Sharmila Barrera MD   docusate sodium (COLACE, DULCOLAX) 100 MG CAPS Take 100 mg by mouth 2 times daily  Patient taking differently: Take 100 mg by mouth daily as needed  9/28/15  Yes Maco Lopez MD   Multiple Vitamins-Minerals (THERAPEUTIC MULTIVITAMIN-MINERALS) tablet Take 1 tablet by mouth daily   Yes Historical Provider, MD   furosemide (LASIX) 20 MG tablet Take 20 mg by mouth daily   Yes Historical Provider, MD   simvastatin (ZOCOR) 40 MG tablet Take 40 mg by mouth nightly. Yes Historical Provider, MD   metoprolol (LOPRESSOR) 25 MG tablet Take 25 mg by mouth 2 times daily. Yes Historical Provider, MD   Calcium Carbonate-Vitamin D (CALCIUM + D) 600-200 MG-UNIT TABS Take  by mouth daily. Yes Historical Provider, MD   aspirin 81 MG tablet Take 81 mg by mouth three times a week    Yes Historical Provider, MD   albuterol (ACCUNEB) 0.63 MG/3ML nebulizer solution Take 1 ampule by nebulization every 6 hours as needed.       Historical Provider, MD   .  Recent Surgical History: None = 0     Assessment     Peak Flow (asthma only)    Predicted:   Personal Best:   PEF unable  % Predicted   Peak Flow :     FEV1/FVC    FEV1 Predicted       FEV1 unable weak    FEV1 % Predicted   FVC   IS volume   IBW   FIO2% 1lpm  SPO2 98%  RR 22  Breath Sounds: clear/Diminished      · Bronchodilator assessment at level  3  · Hyperinflation assessment at level   · Secretion Management assessment at level    ·   · []    Bronchodilator Assessment  BRONCHODILATOR ASSESSMENT SCORE  Score 0 1 2 3 4 5   Breath Sounds   []  Patient Baseline []  No Wheeze good aeration [x]  Faint, scattered wheezing, good aeration []  Expiratory Wheezing and or moderately diminished []  Insp/Exp wheeze and/or very diminished []  Insp/Exp and/ or marked distress   Respiratory Rate   []  Patient Baseline []  Less than 20 []  Less than 20 [x]  20-25 []  Greater than 25 []  Greater than 25   Peak flow % of Pred or PB [x]  NA   []  Greater than 90%  []  81-90% []  71-80% []  Less than or equal to 70%  or unable to perform []  Unable due to Respiratory Distress   Dyspnea re []  Patient Baseline []  No SOB []  No SOB [x]  SOB on exertion []  SOB min activity []  At rest/acute   e FEV% Predicted       []  NA []  Above 69%  []  Unable []  Above 60-69%  []  Unable []  Above 50-59%  [x]  Unable []  Above 35-49%  []  Unable []  Less than 35%  []  Unable                 []  Hyperinflation Assessment  Score 1 2 3   CXR and Breath Sounds   []  Clear []  No atelectasis  Basilar aeration []  Atelectasis or absent basilar breath sounds   Incentive Spirometry Volume  (Per IBW)   []  Greater than or equal to 15ml/Kg []  less than 15ml/Kg []  less than 15ml/Kg   Surgery within last 2 weeks []  None or general   []  Abdominal or thoracic surgery  []  Abdominal or thoracic   Chronic Pulmonary Historyre []  No []  Yes []  Yes     []  Secretion Management Assessment  Score 1 2 3   Bilateral Breath Sounds   []  Occasional Rhonchi []  Scattered Rhonchi []  Course Rhonchi and/or poor aeration   Sputum    []  Small amount of thin secretions []  Moderate amount of viscous secretions []  Copius, Viscious Yellow/ Secretions   CXR as reported by physician []  clear  []  Unavailable

## 2017-10-12 NOTE — PROGRESS NOTES
goal 3 Independent ascend/descend 3 steps w/ 1 HR   Short term goal 4 Tolerate 30 min ther act/  1/2 to 1 grade strength increase   Short term goal 5 Good standing balance   Conditions Requiring Skilled Therapeutic Intervention   Body structures, Functions, Activity limitations Decreased functional mobility ; Decreased strength;Decreased safe awareness;Decreased balance;Decreased endurance   Assessment Patient demonstrates decerased awareness of safety, endurance and strength. Patient is appropraite for SNF to address these deficits. Prognosis Good   Barriers to Learning [place]   REQUIRES PT FOLLOW UP Yes   Discharge Recommendations Subacute/Skilled Nursing Facility   Plan   Times per week 1-2x/day,6-7 days/week x 12 treatments   Current Treatment Recommendations Balance Training;Strengthening; Functional Mobility Training;Transfer Training;Gait Training; Endurance Training;Home Exercise Program;Safety Education & Training   Safety Devices   Type of devices All fall risk precautions in place; Left in bed;Call light within reach; Bed alarm in place;Nurse notified   Restraints   Initially in place Mariangel Mcmanus PTA  Time: [id number]

## 2017-10-12 NOTE — PROGRESS NOTES
Occupational Therapy  DATE: 10/12/2017    NAME: Jed Storey  MRN: [id number]   : 11/10/1925    [facility]  Occupational Therapy Not Seen Note    Patient not available for Occupational Therapy due to:    [] Testing:    [] Hemodialysis    [] Cancelled by RN:    [x]Refusal by Patient: Pt stating she just laid down (semi fowlers position) to eat her breakfast and requesting to come back after she finishes her lunch.     [] Surgery:     [] Intubation:     [] Pain Medication:    [] Sedation:     [] Spine Precautions :    [] Medical Instability:    [] Other:      JOÃO Mcfarlane/L

## 2017-10-12 NOTE — PROGRESS NOTES
cannula  · Incentive spirometry every hour while awake  · DVT prophylaxis with subcutaneous heparin  · Will follow with you    Electronically signed by     Elenita Irene CNP on 10/12/2017 at 10:28 AM  Patient was seen under the supervision of Dr. Jennifer Cervantes and Sleep Medicine,    [facility] [place]: [phone]

## 2017-10-12 NOTE — PROGRESS NOTES
PCP: Alecia Saucedo MD  10/12/2017  5:57 PM    Admitting Diagnosis:  CO2 narcosis    Problem List:  Active Hospital Problems    Diagnosis Date Noted    Protein-calorie malnutrition, severe ([facility] [E43] 10/09/2017    Pulmonary hypertension [I27.20] 10/09/2017    Non-rheumatic tricuspid valve insufficiency [I36.1] 10/09/2017    Acute on chronic respiratory failure with hypercapnia (HCC) [J96.22] 10/08/2017    CO2 narcosis [R06.89] 10/08/2017    Elevated troponin [R74.8] 10/08/2017    Hypotension [I95.9] 10/08/2017    Chronic obstructive pulmonary disease with acute exacerbation (HCC) [J44.1] 10/08/2017       Chief Complaint: CO2 narcosis    Subjective: above resolved. Allergies:   Allergies   Allergen Reactions    Augmentin [Amoxicillin-Pot Clavulanate] Other (See Comments)     unknown       Current Medications:    predniSONE (DELTASONE) tablet 20 mg BID   vancomycin (VANCOCIN) 500 mg in dextrose 5 % 100 mL IVPB (mini-bag) Q36H   levofloxacin (LEVAQUIN) 250 MG/50ML infusion 250 mg Q24H   brimonidine (ALPHAGAN) 0.2 % ophthalmic solution 1 drop BID   HYDROcodone-acetaminophen (NORCO) 5-325 MG per tablet 1 tablet Q6H PRN   aspirin EC tablet 81 mg Daily   docusate sodium (COLACE) capsule 100 mg BID   therapeutic multivitamin-minerals 1 tablet Daily   ondansetron (ZOFRAN) tablet 4 mg Q8H PRN   insulin lispro (HUMALOG) injection vial 0-12 Units TID WC   insulin lispro (HUMALOG) injection vial 0-6 Units Nightly   albuterol (PROVENTIL) nebulizer solution 2.5 mg As Directed RT PRN   Calcium Carb-Cholecalciferol 600-400 MG-UNIT TABS 1 tablet Daily   pneumococcal 13-valent conjugate (PREVNAR) injection 0.5 mL Once   influenza quadrivalent split vaccine (FLUZONE;FLUARIX;FLULAVAL;AFLURIA) injection 0.5 mL Once   glucose (GLUTOSE) 40 % oral gel 15 g PRN   dextrose 50 % solution 12.5 g PRN   glucagon (rDNA) injection 1 mg PRN   dextrose 5 % solution PRN   vancomycin (VANCOCIN) intermittent dosing (placeholder) RX --   90   LIPASE   --    --   74*         Plan:  MRI pancreas per Radiologist's recommendation. Discussed with patient/family - expressed understanding. Agree with above.     Electronically signed by Rogers Fisher MD on 10/12/2017 at 5:57 PM

## 2017-10-13 ENCOUNTER — APPOINTMENT (OUTPATIENT)
Dept: MRI IMAGING | Age: 82
DRG: 189 | End: 2017-10-13
Payer: MEDICARE

## 2017-10-13 ENCOUNTER — APPOINTMENT (OUTPATIENT)
Dept: GENERAL RADIOLOGY | Age: 82
DRG: 189 | End: 2017-10-13
Payer: MEDICARE

## 2017-10-13 VITALS
OXYGEN SATURATION: 95 % | TEMPERATURE: 97.3 F | HEART RATE: 109 BPM | WEIGHT: 78.3 LBS | HEIGHT: 62 IN | SYSTOLIC BLOOD PRESSURE: 123 MMHG | DIASTOLIC BLOOD PRESSURE: 65 MMHG | BODY MASS INDEX: 14.41 KG/M2 | RESPIRATION RATE: 16 BRPM

## 2017-10-13 PROBLEM — R97.8 ELEVATED TUMOR MARKERS: Status: ACTIVE | Noted: 2017-10-13

## 2017-10-13 LAB
GLUCOSE BLD-MCNC: 109 MG/DL (ref 65–105)
GLUCOSE BLD-MCNC: 127 MG/DL (ref 65–105)
GLUCOSE BLD-MCNC: 175 MG/DL (ref 65–105)

## 2017-10-13 PROCEDURE — 71010 XR CHEST PORTABLE: CPT

## 2017-10-13 PROCEDURE — 97530 THERAPEUTIC ACTIVITIES: CPT

## 2017-10-13 PROCEDURE — 6360000002 HC RX W HCPCS: Performed by: INTERNAL MEDICINE

## 2017-10-13 PROCEDURE — 6370000000 HC RX 637 (ALT 250 FOR IP): Performed by: INTERNAL MEDICINE

## 2017-10-13 PROCEDURE — 97116 GAIT TRAINING THERAPY: CPT

## 2017-10-13 PROCEDURE — 6370000000 HC RX 637 (ALT 250 FOR IP): Performed by: NURSE PRACTITIONER

## 2017-10-13 PROCEDURE — 74183 MRI ABD W/O CNTR FLWD CNTR: CPT

## 2017-10-13 PROCEDURE — 97535 SELF CARE MNGMENT TRAINING: CPT | Performed by: NURSE PRACTITIONER

## 2017-10-13 PROCEDURE — 2580000003 HC RX 258: Performed by: INTERNAL MEDICINE

## 2017-10-13 PROCEDURE — 94640 AIRWAY INHALATION TREATMENT: CPT

## 2017-10-13 PROCEDURE — 97530 THERAPEUTIC ACTIVITIES: CPT | Performed by: NURSE PRACTITIONER

## 2017-10-13 PROCEDURE — 82947 ASSAY GLUCOSE BLOOD QUANT: CPT

## 2017-10-13 PROCEDURE — A9579 GAD-BASE MR CONTRAST NOS,1ML: HCPCS | Performed by: INTERNAL MEDICINE

## 2017-10-13 PROCEDURE — 6360000004 HC RX CONTRAST MEDICATION: Performed by: INTERNAL MEDICINE

## 2017-10-13 PROCEDURE — 94761 N-INVAS EAR/PLS OXIMETRY MLT: CPT

## 2017-10-13 RX ORDER — HYDROCODONE BITARTRATE AND ACETAMINOPHEN 5; 325 MG/1; MG/1
1 TABLET ORAL EVERY 6 HOURS PRN
Qty: 20 TABLET | Refills: 0 | Status: SHIPPED | OUTPATIENT
Start: 2017-10-13 | End: 2018-01-18

## 2017-10-13 RX ORDER — CALCIUM CARBONATE/VITAMIN D3 600 MG-10
1 TABLET ORAL DAILY
Qty: 60 TABLET | Refills: 1
Start: 2017-10-14 | End: 2018-01-18

## 2017-10-13 RX ORDER — SODIUM CHLORIDE 0.9 % (FLUSH) 0.9 %
10 SYRINGE (ML) INJECTION
Status: COMPLETED | OUTPATIENT
Start: 2017-10-13 | End: 2017-10-13

## 2017-10-13 RX ORDER — IPRATROPIUM BROMIDE AND ALBUTEROL SULFATE 2.5; .5 MG/3ML; MG/3ML
3 SOLUTION RESPIRATORY (INHALATION)
Qty: 360 ML | Refills: 3 | Status: ON HOLD
Start: 2017-10-13 | End: 2018-01-19 | Stop reason: HOSPADM

## 2017-10-13 RX ORDER — LUBIPROSTONE 8 UG/1
8 CAPSULE, GELATIN COATED ORAL 2 TIMES DAILY WITH MEALS
Qty: 30 CAPSULE | Refills: 3
Start: 2017-10-13 | End: 2018-01-18

## 2017-10-13 RX ORDER — PREDNISONE 10 MG/1
30 TABLET ORAL DAILY
Qty: 30 TABLET | Refills: 0 | Status: SHIPPED | OUTPATIENT
Start: 2017-10-14 | End: 2017-10-24

## 2017-10-13 RX ORDER — LEVOFLOXACIN 500 MG/1
500 TABLET, FILM COATED ORAL DAILY
Qty: 10 TABLET | Refills: 0
Start: 2017-10-13 | End: 2017-10-23

## 2017-10-13 RX ORDER — 0.9 % SODIUM CHLORIDE 0.9 %
20 INTRAVENOUS SOLUTION INTRAVENOUS
Status: COMPLETED | OUTPATIENT
Start: 2017-10-13 | End: 2017-10-13

## 2017-10-13 RX ADMIN — Medication 1 TABLET: at 10:14

## 2017-10-13 RX ADMIN — LEVOFLOXACIN 250 MG: 5 INJECTION, SOLUTION INTRAVENOUS at 02:18

## 2017-10-13 RX ADMIN — SODIUM CHLORIDE 20 ML: 9 INJECTION, SOLUTION INTRAVENOUS at 09:38

## 2017-10-13 RX ADMIN — IPRATROPIUM BROMIDE AND ALBUTEROL SULFATE 1 AMPULE: .5; 3 SOLUTION RESPIRATORY (INHALATION) at 15:10

## 2017-10-13 RX ADMIN — PREDNISONE 20 MG: 20 TABLET ORAL at 10:13

## 2017-10-13 RX ADMIN — Medication 1 TABLET: at 10:13

## 2017-10-13 RX ADMIN — INSULIN LISPRO 2 UNITS: 100 INJECTION, SOLUTION INTRAVENOUS; SUBCUTANEOUS at 17:43

## 2017-10-13 RX ADMIN — MOMETASONE FUROATE AND FORMOTEROL FUMARATE DIHYDRATE 2 PUFF: 200; 5 AEROSOL RESPIRATORY (INHALATION) at 07:23

## 2017-10-13 RX ADMIN — BRIMONIDINE TARTRATE 1 DROP: 2 SOLUTION OPHTHALMIC at 10:13

## 2017-10-13 RX ADMIN — IPRATROPIUM BROMIDE AND ALBUTEROL SULFATE 1 AMPULE: .5; 3 SOLUTION RESPIRATORY (INHALATION) at 10:49

## 2017-10-13 RX ADMIN — IPRATROPIUM BROMIDE AND ALBUTEROL SULFATE 1 AMPULE: .5; 3 SOLUTION RESPIRATORY (INHALATION) at 07:24

## 2017-10-13 RX ADMIN — HEPARIN SODIUM 5000 UNITS: 5000 INJECTION, SOLUTION INTRAVENOUS; SUBCUTANEOUS at 10:14

## 2017-10-13 RX ADMIN — ASPIRIN 81 MG: 81 TABLET, COATED ORAL at 10:14

## 2017-10-13 RX ADMIN — GADOPENTETATE DIMEGLUMINE 10 ML: 469.01 INJECTION INTRAVENOUS at 09:38

## 2017-10-13 RX ADMIN — DOCUSATE SODIUM 100 MG: 100 CAPSULE, LIQUID FILLED ORAL at 10:14

## 2017-10-13 RX ADMIN — Medication 10 ML: at 09:39

## 2017-10-13 ASSESSMENT — PAIN SCALES - GENERAL
PAINLEVEL_OUTOF10: 0
PAINLEVEL_OUTOF10: 0

## 2017-10-13 NOTE — PLAN OF CARE
Problem: Falls - Risk of  Goal: Absence of falls  Outcome: Ongoing  Patient has remained free from falls this shift. Patient is alert and oriented times four. Bed to lowest position with door open. Patient care items and call light in reach. Patient uses call light appropriately for assist. Will continue to monitor. Please see fall assessment.        Comments: careplan reviewed with pt and family

## 2017-10-13 NOTE — PLAN OF CARE
Problem: Falls - Risk of  Intervention: Fall risk assessment  Patient is fall risk per fall scale. Falling star on door. Fall sticker on armband. Hourly rounding performed. Personal belongings and call light within reach. Bed locked and in low position. Bed alarm on. Bathroom assistance offered with hourly rounding and prn. Will continue to monitor.

## 2017-10-13 NOTE — PROGRESS NOTES
Pt will be discharged to [facility], Sukhdev Chamberlain faxed, transport will be at [phone], spoke with Reza Novoa in admissions, pt and family aware

## 2017-10-13 NOTE — PROGRESS NOTES
Nutrition Assessment    Type and Reason for Visit: Reassess    Nutrition Recommendations: 1. Suggest adjusting general diet to include a low potassium restriction. 2. Decrease Ensure Clear ONS x 2/day. 3. Suggest Nepro at lunches. 4. Feeding assistance/cues may be helpful. Malnutrition Assessment:  · Malnutrition Status: Meets the criteria for severe malnutrition  · Context: Chronic illness  · Findings of the 6 clinical characteristics of malnutrition (Minimum of 2 out of 6 clinical characteristics is required to make the diagnosis of moderate or severe Protein Calorie Malnutrition based on AND/ASPEN Guidelines):  1. Energy Intake-Less than or equal to 75%, greater than or equal to 3 months    2. Weight Loss-2% loss or greater,  (in 4 months)  3. Fat Loss-Severe subcutaneous fat loss, Orbital  4. Muscle Loss-Severe muscle mass loss, Temples (temporalis muscle)  5. Fluid Accumulation-No significant fluid accumulation  6.  Strength-Not measured    Nutrition Diagnosis:   · Problem: Severe malnutrition, in context of chronic illness  · Etiology: related to Impaired respiratory function-inability to consume food     Signs and symptoms:  as evidenced by Diet history of poor intake, Presence of wounds, Severe loss of subcutaneous fat, Severe muscle loss, Wt loss:  +1.4 kg--3.8% since 6/2/17    Nutrition Assessment:  · Subjective Assessment: Pt is n/a with multiple members of staff in room at both times attempted today. Note pt is on an adjusted potassium restricted diet. Spoke with fei (Dietary) and she reports pt possibly needing feeding assistance.     · Nutrition-Focused Physical Findings: GI/PV:  WDL; Skin:  +coccyx (preventative dressing), +sacrum (mepilex dressing)  · Wound Type: Multiple, Pressure Ulcer  · Current Nutrition Therapies:  · Oral Diet Orders: General   · Oral Diet intake: Unable to assess  · Anthropometric Measures:  · Ht: 5' 2\" (157.5 cm)   · Admission Body Wt: 78 lb 14.8 oz (35.8

## 2017-10-13 NOTE — PROGRESS NOTES
cues for good safety   Short term goal 2 demo CGA with functional mob in room for ADL completion with min cues for safety   Short term goal 3 demo min A with toileting routine   Short term goal 4 demo min A UB/LB ADLs following set up at approp level   Short term goal 5 verb understanding of fall prevention techs and equip needs    Assessment   Performance deficits / Impairments Decreased functional mobility ; Decreased ADL status; Decreased strength;Decreased safe awareness;Decreased cognition;Decreased balance;Decreased endurance   Prognosis Good   Patient Education OT POC, safety during transfers and mobility   REQUIRES OT FOLLOW UP Yes   Discharge Recommendations Subacute/Skilled Nursing Facility   Activity Tolerance   Activity Tolerance Patient limited by fatigue   Safety Devices   Safety Devices in place Yes   Type of devices Nurse notified; Left in bed;Patient at risk for falls;Gait belt;Call light within reach; Bed alarm in place; All fall risk precautions in place   Restraints   Initially in place No   Other Comments   Comments TIME:[id number]   Plan   Times per week 4-5x/week, 1-2x/day   Current Treatment Recommendations Strengthening;Balance Training;Functional Mobility Training; Endurance Training; Safety Education & Training;Self-Care / ADL; Patient/Caregiver Education & Training;Equipment Evaluation, Education, & procurement   Upon arrival pt seated in chair. Sit>stamd, demo mobility to bathroom to complete transfer to toilet to complete toileting and hygiene. No LOB noted. Demo mobility to EOB, sit>supine, repositioned towards HOB. Upon writer exit, call light within reach, pt retired to bed. All lines intact and patient positioned comfortably. All patient needs addressed prior to ending therapy session. Chart reviewed prior to treatment and patient is agreeable for therapy. RN reports patient is medically stable for therapy treatment this date.   Patient would benefit from SNF for continued occupational

## 2017-10-13 NOTE — PROGRESS NOTES
Pulmonary Critical Care Progress Note  Laurence Faria CNP / Dr. Michaela Parikh M.D. Patient seen for the follow up of Hypercarbic respiratory failure CO2 narcosis     Subjective:  She denies any significant shortness of breath. She has no complaints of cough or chest pain. She went down for MRI of her spine today and tolerated well. She is currently without complaint. Examination:  Vitals: BP (!) 147/78   Pulse 110   Temp 97.5 °F (36.4 °C) (Oral)   Resp 18   Ht 5' 2\" (1.575 m)   Wt 78 lb 4.8 oz (35.5 kg)   SpO2 100%   BMI 14.32 kg/m²     General appearance: alert and cooperative with exam, eating breakfast and talking on the phone  Neck: No JVD  Lungs: Decreased air exchange, no significant wheeze or rhonchi  Heart: regular rate and rhythm, S1, S2 normal, no gallop  Abdomen: Soft, non tender, + BS  Extremities: no cyanosis or clubbing.  No edema    LABs:  CBC:   Recent Labs      10/11/17   0329  10/12/17   0733   WBC  10.7  6.9   HGB  12.2  13.5   HCT  37.0  40.8   PLT  183  171     BMP:   Recent Labs      10/11/17   0329  10/12/17   0641   NA  142  143   K  5.1  5.2   CO2  30  28   BUN  32*  23   CREATININE  0.91*  0.74   LABGLOM  58*  >60   GLUCOSE  127*  84     LIVER PROFILE:  Recent Labs      10/11/17   0329   AST  81*   ALT  240*   LABALBU  3.1*     Radiology:      Impression:  · Acute Hypercarbic respiratory failure  · Acute exacerbation of COPD/Severe emphysema  · Acute Tracheo-bronchitis  · Atelectasis  · Pleural effusions R>L  · Pulmonary hypertension  · Hypotension/dehydration  · Acute kidney injury  · Back pain status post fall  · 40 pack year smoking history    Recommendations:  · Continue IV Levaquin/Vanco  · Prednisone taper  · Albuterol and Ipratropium Q 4 hours and prn  · Dulera 200  · Labs: CBC and BMP in am  · BiPAP as needed  · 2 liters/min via nasal cannula  · Incentive spirometry every hour while awake  · DVT prophylaxis with subcutaneous heparin  · Will follow with you    Electronically signed by     Vasu Chavez CNP on 10/13/2017 at 9:22 AM  Patient was seen under the supervision of Dr. Doc Gómez and Sleep Medicine,    Patient seen and evaluated by me. She is sitting up in the chair. Denies chest pain. Her shortness of breath is doing better. Denies any significant cough. Lungs exam reveals few basilar crackles with moderate air exchange. X-ray chest was reviewed which shows significant COPD changes with small bilateral pleural effusions. Since clinically she is doing better I will hold off any diuresis at this time. Continue bronchodilators. Repeat x-ray chest in the morning. For now continue IV Levaquin and vancomycin. Above was reviewed and discussed with NP. And I agree with assessment and plan of care.   Electronically signed by     Piedad Weber MD on 10/13/2017 at 12:18 PM  Pulmonary Critical Care and Sleep Medicine,  [facility]  Cell: [phone]  Office: [phone]

## 2017-10-13 NOTE — CARE COORDINATION
SHARRI spoke with [facility] regarding if pt do not discharge today, will need new pre-cert that will be started on Monday. SHARRI informed Zari GA regarding insurance pre-cert status.

## 2017-10-13 NOTE — PROGRESS NOTES
--    --    ALKPHOS  68   --    --    BILITOT  0.28*   --    --    BILIDIR  <0.08   --    --    AMYLASE   --    --   90   LIPASE   --    --   74*     MRI (report): 4 mm small cyst or cystic lesion pancreatic tail. Discussed above with patient/family at bedside. No further work-up at this time. Monitor tumor markers as out-patient     Repeat imaging studies periodically. Plan:  Overall stable for discharge to SNF. Meds reconciled/scripts/BURAK. Discussed Dx, meds, care plan, further studies as out-patient, questions answered. SNF Physician to assume care while in facility. Will follow patient in office after discharge from SNF. Above all discussed with patient and family - agree/expressed understanding.   >30 min  Electronically signed by Rogers Fisher MD on 10/13/2017 at 5:54 PM

## 2017-10-13 NOTE — DISCHARGE SUMMARY
Discharge Summary    Patient ID: Braeden Guerrero    MRN: [id number]     Acct:  [de-identified]       Patient's PCP: Loly Villafana MD    Admit Date: 10/8/2017     Discharge Date:       Admitting Physician: Rosa Decker MD    Discharge Physician: Loly Villafana MD     Discharge Diagnoses:  CO2 Narcosis  Acute on Chronic respiratory failure with hypercapnia. COPD with exacerbation  Pulmonary Hypertension  Protein-calorie malnutrition, severe  Elevated Troponin  Tricuspid regurgitation  Elevated Tumor Markers    Primary Problem  CO2 narcosis    Active Hospital Problems    Diagnosis Date Noted    Elevated tumor markers [R97.8] 10/13/2017    Protein-calorie malnutrition, severe ([facility] [address] [E43] 10/09/2017    Pulmonary hypertension [I27.20] 10/09/2017    Non-rheumatic tricuspid valve insufficiency [I36.1] 10/09/2017    Acute on chronic respiratory failure with hypercapnia (HCC) [J96.22] 10/08/2017    CO2 narcosis [R06.89] 10/08/2017    Elevated troponin [R74.8] 10/08/2017    Hypotension [I95.9] 10/08/2017    Chronic obstructive pulmonary disease with acute exacerbation (HCC) [J44.1] 10/08/2017     Past Medical History:   Diagnosis Date    CAD (coronary artery disease)     Cancer ([facility] [address]     Colon polyp     COPD (chronic obstructive pulmonary disease) ([facility] [address]     Hypertension     Hyperthyroidism     Pelvic fracture (HCC)     Unspecified cerebral artery occlusion with cerebral infarction      The patient was seen and examined on day of discharge and this discharge summary is in conjunction with any daily progress note from day of discharge. Code Status:  [facility] Course: uneventful. Consults:  pulmonary/intensive care    Significant Diagnostic Studies: see EHR. Treatments: as above    Disposition: SNF    Discharged Condition: Stable    Follow Up:  Loly Villafana MD after discharge from SNF. SNF Physician to assume care while in facility. Discharge Medications:   BURAK signed.  O2, BiPaP, TIKI Woodard, [address] [facility] Medication Instructions [id number]    Printed on:10/13/17 6562   Medication Information                      albuterol (ACCUNEB) 0.63 MG/3ML nebulizer solution  Take 1 ampule by nebulization every 6 hours as needed. aspirin 81 MG tablet  Take 81 mg by mouth three times a week              brimonidine (ALPHAGAN P) 0.1 % SOLN  Place 1 drop into both eyes 2 times daily             Calcium Carb-Cholecalciferol 600-400 MG-UNIT TABS  Take 1 tablet by mouth daily             Calcium Carbonate-Vitamin D (CALCIUM + D) 600-200 MG-UNIT TABS  Take  by mouth daily. docusate sodium (COLACE, DULCOLAX) 100 MG CAPS  Take 100 mg by mouth 2 times daily             HYDROcodone-acetaminophen (NORCO) 5-325 MG per tablet  Take 1 tablet by mouth every 6 hours as needed (PRN pain management)  Further refills per SNF. Srinath Forbes Earliest Fill Date: 10/13/17             ipratropium-albuterol (DUONEB) 0.5-2.5 (3) MG/3ML SOLN nebulizer solution  Inhale 3 mLs into the lungs every 4 hours (while awake)             levofloxacin (LEVAQUIN) 500 MG tablet  Take 1 tablet by mouth daily for 10 days             lisinopril (PRINIVIL;ZESTRIL) 20 MG tablet  Take 20 mg by mouth daily             metoprolol (LOPRESSOR) 25 MG tablet  Take 25 mg by mouth 2 times daily. Multiple Vitamins-Minerals (THERAPEUTIC MULTIVITAMIN-MINERALS) tablet  Take 1 tablet by mouth daily             predniSONE (DELTASONE) 10 MG tablet  Take 3 tablets by mouth daily for 10 days             simvastatin (ZOCOR) 40 MG tablet  Take 40 mg by mouth nightly. Activity: per SNF. Diet: regular diet    Time Spent on discharge is more than 30 minutes in the examination, evaluation, counseling and review of medications and discharge plan.       Electronically signed by Dheeraj Latif MD on 10/13/2017 at 6:53 PM

## 2017-10-14 LAB
CULTURE: NORMAL
Lab: NORMAL
Lab: NORMAL
SPECIMEN DESCRIPTION: NORMAL
STATUS: NORMAL
STATUS: NORMAL

## 2017-10-14 NOTE — PROGRESS NOTES
Pt discharged to from hospital for [facility] via [facility]. Pt left with all valuables. INT removed with catheter intact. Pt escorted by  via wheelchair to [facility]. No distress noted at discharge.

## 2017-10-16 ENCOUNTER — HOSPITAL ENCOUNTER (OUTPATIENT)
Age: 82
Setting detail: SPECIMEN
Discharge: HOME | End: 2017-10-16
Payer: MEDICARE

## 2017-10-16 LAB
ANION GAP SERPL CALCULATED.3IONS-SCNC: 8 MMOL/L (ref 9–17)
BUN BLDV-MCNC: 25 MG/DL (ref 8–23)
BUN/CREAT BLD: 27 (ref 9–20)
CALCIUM SERPL-MCNC: 8.3 MG/DL (ref 8.6–10.4)
CHLORIDE BLD-SCNC: 104 MMOL/L (ref 98–107)
CO2: 31 MMOL/L (ref 20–31)
CREAT SERPL-MCNC: 0.93 MG/DL (ref 0.5–0.9)
GFR AFRICAN AMERICAN: >60 ML/MIN
GFR NON-AFRICAN AMERICAN: 57 ML/MIN
GFR SERPL CREATININE-BSD FRML MDRD: ABNORMAL ML/MIN/{1.73_M2}
GFR SERPL CREATININE-BSD FRML MDRD: ABNORMAL ML/MIN/{1.73_M2}
GLUCOSE BLD-MCNC: 94 MG/DL (ref 70–99)
HCT VFR BLD CALC: 35.4 % (ref 36–46)
HEMOGLOBIN: 11.6 G/DL (ref 12–16)
MCH RBC QN AUTO: 30.8 PG (ref 26–34)
MCHC RBC AUTO-ENTMCNC: 32.9 G/DL (ref 31–37)
MCV RBC AUTO: 93.6 FL (ref 80–100)
PDW BLD-RTO: 13.9 % (ref 11.5–14.5)
PLATELET # BLD: 179 K/UL (ref 130–400)
PMV BLD AUTO: 8.3 FL (ref 6–12)
POTASSIUM SERPL-SCNC: 4.3 MMOL/L (ref 3.7–5.3)
RBC # BLD: 3.78 M/UL (ref 4–5.2)
SODIUM BLD-SCNC: 143 MMOL/L (ref 135–144)
WBC # BLD: 6.5 K/UL (ref 3.5–11)

## 2017-10-16 PROCEDURE — 80048 BASIC METABOLIC PNL TOTAL CA: CPT

## 2017-10-16 PROCEDURE — P9603 ONE-WAY ALLOW PRORATED MILES: HCPCS

## 2017-10-16 PROCEDURE — 36415 COLL VENOUS BLD VENIPUNCTURE: CPT

## 2017-10-16 PROCEDURE — 85027 COMPLETE CBC AUTOMATED: CPT

## 2017-10-23 ENCOUNTER — HOSPITAL ENCOUNTER (OUTPATIENT)
Age: 82
Setting detail: SPECIMEN
Discharge: HOME | End: 2017-10-23
Payer: MEDICARE

## 2017-10-23 LAB
ANION GAP SERPL CALCULATED.3IONS-SCNC: 8 MMOL/L (ref 9–17)
BUN BLDV-MCNC: 21 MG/DL (ref 8–23)
BUN/CREAT BLD: 23 (ref 9–20)
CALCIUM SERPL-MCNC: 8.8 MG/DL (ref 8.6–10.4)
CHLORIDE BLD-SCNC: 107 MMOL/L (ref 98–107)
CO2: 29 MMOL/L (ref 20–31)
CREAT SERPL-MCNC: 0.93 MG/DL (ref 0.5–0.9)
GFR AFRICAN AMERICAN: >60 ML/MIN
GFR NON-AFRICAN AMERICAN: 57 ML/MIN
GFR SERPL CREATININE-BSD FRML MDRD: ABNORMAL ML/MIN/{1.73_M2}
GFR SERPL CREATININE-BSD FRML MDRD: ABNORMAL ML/MIN/{1.73_M2}
GLUCOSE BLD-MCNC: 90 MG/DL (ref 70–99)
HCT VFR BLD CALC: 34.5 % (ref 36–46)
HEMOGLOBIN: 11.4 G/DL (ref 12–16)
MCH RBC QN AUTO: 31 PG (ref 26–34)
MCHC RBC AUTO-ENTMCNC: 33 G/DL (ref 31–37)
MCV RBC AUTO: 94 FL (ref 80–100)
PDW BLD-RTO: 14.1 % (ref 11.5–14.5)
PLATELET # BLD: 153 K/UL (ref 130–400)
PMV BLD AUTO: 7.6 FL (ref 6–12)
POTASSIUM SERPL-SCNC: 4.8 MMOL/L (ref 3.7–5.3)
RBC # BLD: 3.68 M/UL (ref 4–5.2)
SODIUM BLD-SCNC: 144 MMOL/L (ref 135–144)
WBC # BLD: 5.7 K/UL (ref 3.5–11)

## 2017-10-23 PROCEDURE — P9603 ONE-WAY ALLOW PRORATED MILES: HCPCS

## 2017-10-23 PROCEDURE — 80048 BASIC METABOLIC PNL TOTAL CA: CPT

## 2017-10-23 PROCEDURE — 36415 COLL VENOUS BLD VENIPUNCTURE: CPT

## 2017-10-23 PROCEDURE — 85027 COMPLETE CBC AUTOMATED: CPT

## 2017-10-27 ENCOUNTER — HOSPITAL ENCOUNTER (OUTPATIENT)
Age: 82
Setting detail: SPECIMEN
Discharge: HOME | End: 2017-10-27
Payer: MEDICARE

## 2017-10-27 LAB
HCT VFR BLD CALC: 34.7 % (ref 36–46)
HEMOGLOBIN: 11.6 G/DL (ref 12–16)

## 2017-10-27 PROCEDURE — P9603 ONE-WAY ALLOW PRORATED MILES: HCPCS

## 2017-10-27 PROCEDURE — 85018 HEMOGLOBIN: CPT

## 2017-10-27 PROCEDURE — 36415 COLL VENOUS BLD VENIPUNCTURE: CPT

## 2017-10-27 PROCEDURE — 85014 HEMATOCRIT: CPT

## 2017-10-30 ENCOUNTER — HOSPITAL ENCOUNTER (OUTPATIENT)
Age: 82
Setting detail: SPECIMEN
Discharge: HOME | End: 2017-10-30
Payer: MEDICARE

## 2017-10-30 LAB
ANION GAP SERPL CALCULATED.3IONS-SCNC: 8 MMOL/L (ref 9–17)
BUN BLDV-MCNC: 22 MG/DL (ref 8–23)
BUN/CREAT BLD: 26 (ref 9–20)
CALCIUM SERPL-MCNC: 8.2 MG/DL (ref 8.6–10.4)
CHLORIDE BLD-SCNC: 107 MMOL/L (ref 98–107)
CO2: 28 MMOL/L (ref 20–31)
CREAT SERPL-MCNC: 0.85 MG/DL (ref 0.5–0.9)
GFR AFRICAN AMERICAN: >60 ML/MIN
GFR NON-AFRICAN AMERICAN: >60 ML/MIN
GFR SERPL CREATININE-BSD FRML MDRD: ABNORMAL ML/MIN/{1.73_M2}
GFR SERPL CREATININE-BSD FRML MDRD: ABNORMAL ML/MIN/{1.73_M2}
GLUCOSE BLD-MCNC: 89 MG/DL (ref 70–99)
HCT VFR BLD CALC: 29.2 % (ref 36–46)
HEMOGLOBIN: 9.7 G/DL (ref 12–16)
MCH RBC QN AUTO: 31 PG (ref 26–34)
MCHC RBC AUTO-ENTMCNC: 33.1 G/DL (ref 31–37)
MCV RBC AUTO: 93.6 FL (ref 80–100)
PDW BLD-RTO: 14.6 % (ref 11.5–14.5)
PLATELET # BLD: 103 K/UL (ref 130–400)
PMV BLD AUTO: 8.3 FL (ref 6–12)
POTASSIUM SERPL-SCNC: 4.7 MMOL/L (ref 3.7–5.3)
RBC # BLD: 3.12 M/UL (ref 4–5.2)
SODIUM BLD-SCNC: 143 MMOL/L (ref 135–144)
WBC # BLD: 3.5 K/UL (ref 3.5–11)

## 2017-10-30 PROCEDURE — 36415 COLL VENOUS BLD VENIPUNCTURE: CPT

## 2017-10-30 PROCEDURE — 80048 BASIC METABOLIC PNL TOTAL CA: CPT

## 2017-10-30 PROCEDURE — 85027 COMPLETE CBC AUTOMATED: CPT

## 2017-10-30 PROCEDURE — P9603 ONE-WAY ALLOW PRORATED MILES: HCPCS

## 2018-01-18 ENCOUNTER — APPOINTMENT (OUTPATIENT)
Dept: GENERAL RADIOLOGY | Age: 83
DRG: 189 | End: 2018-01-18
Payer: MEDICARE

## 2018-01-18 ENCOUNTER — HOSPITAL ENCOUNTER (INPATIENT)
Age: 83
LOS: 2 days | Discharge: HOSPICE | DRG: 189 | End: 2018-01-20
Attending: EMERGENCY MEDICINE | Admitting: INTERNAL MEDICINE
Payer: MEDICARE

## 2018-01-18 DIAGNOSIS — J44.1 COPD EXACERBATION (HCC): Primary | ICD-10-CM

## 2018-01-18 PROBLEM — E86.0 DEHYDRATION WITH HYPERNATREMIA: Status: ACTIVE | Noted: 2018-01-18

## 2018-01-18 PROBLEM — N17.9 ACUTE KIDNEY INJURY (NONTRAUMATIC) (HCC): Status: ACTIVE | Noted: 2018-01-18

## 2018-01-18 PROBLEM — J96.21 ACUTE ON CHRONIC RESPIRATORY FAILURE WITH HYPOXIA AND HYPERCAPNIA (HCC): Status: ACTIVE | Noted: 2018-01-18

## 2018-01-18 PROBLEM — E43 SEVERE PROTEIN-CALORIE MALNUTRITION (HCC): Status: ACTIVE | Noted: 2018-01-18

## 2018-01-18 PROBLEM — J96.22 ACUTE ON CHRONIC RESPIRATORY FAILURE WITH HYPOXIA AND HYPERCAPNIA (HCC): Status: ACTIVE | Noted: 2018-01-18

## 2018-01-18 PROBLEM — E87.0 DEHYDRATION WITH HYPERNATREMIA: Status: ACTIVE | Noted: 2018-01-18

## 2018-01-18 LAB
ABSOLUTE EOS #: 0 K/UL (ref 0–0.4)
ABSOLUTE IMMATURE GRANULOCYTE: ABNORMAL K/UL (ref 0–0.3)
ABSOLUTE LYMPH #: 0.5 K/UL (ref 1–4.8)
ABSOLUTE MONO #: 0.3 K/UL (ref 0.2–0.8)
ANION GAP SERPL CALCULATED.3IONS-SCNC: 13 MMOL/L (ref 9–17)
BASOPHILS # BLD: 1 % (ref 0–2)
BASOPHILS ABSOLUTE: 0 K/UL (ref 0–0.2)
BNP INTERPRETATION: ABNORMAL
BUN BLDV-MCNC: 59 MG/DL (ref 8–23)
BUN/CREAT BLD: 49 (ref 9–20)
CALCIUM IONIZED: 1.25 MMOL/L (ref 1.13–1.33)
CALCIUM SERPL-MCNC: 8.8 MG/DL (ref 8.6–10.4)
CHLORIDE BLD-SCNC: 100 MMOL/L (ref 98–107)
CHP ED QC CHECK: YES
CO2: 35 MMOL/L (ref 20–31)
CREAT SERPL-MCNC: 1.21 MG/DL (ref 0.5–0.9)
DIFFERENTIAL TYPE: ABNORMAL
EKG ATRIAL RATE: 101 BPM
EKG P AXIS: 68 DEGREES
EKG P-R INTERVAL: 122 MS
EKG Q-T INTERVAL: 354 MS
EKG QRS DURATION: 74 MS
EKG QTC CALCULATION (BAZETT): 459 MS
EKG R AXIS: 111 DEGREES
EKG T AXIS: 51 DEGREES
EKG VENTRICULAR RATE: 101 BPM
EOSINOPHILS RELATIVE PERCENT: 0 % (ref 1–4)
FIO2: ABNORMAL
GFR AFRICAN AMERICAN: 50 ML/MIN
GFR NON-AFRICAN AMERICAN: 42 ML/MIN
GFR SERPL CREATININE-BSD FRML MDRD: ABNORMAL ML/MIN/{1.73_M2}
GFR SERPL CREATININE-BSD FRML MDRD: ABNORMAL ML/MIN/{1.73_M2}
GLUCOSE BLD-MCNC: 169 MG/DL
GLUCOSE BLD-MCNC: 169 MG/DL (ref 65–105)
GLUCOSE BLD-MCNC: 86 MG/DL (ref 65–105)
GLUCOSE BLD-MCNC: 86 MG/DL (ref 70–99)
HCO3 VENOUS: 37.3 MMOL/L (ref 24–30)
HCT VFR BLD CALC: 47.6 % (ref 36–46)
HEMOGLOBIN: 14.9 G/DL (ref 12–16)
IMMATURE GRANULOCYTES: ABNORMAL %
LACTIC ACID: 1.4 MMOL/L (ref 0.5–2.2)
LYMPHOCYTES # BLD: 8 % (ref 24–44)
MAGNESIUM: 2.6 MG/DL (ref 1.6–2.6)
MCH RBC QN AUTO: 31 PG (ref 26–34)
MCHC RBC AUTO-ENTMCNC: 31.3 G/DL (ref 31–37)
MCV RBC AUTO: 99.1 FL (ref 80–100)
MONOCYTES # BLD: 4 % (ref 1–7)
NEGATIVE BASE EXCESS, VEN: ABNORMAL (ref 0–2)
NRBC AUTOMATED: ABNORMAL PER 100 WBC
O2 DEVICE/FLOW/%: ABNORMAL
O2 SAT, VEN: 98 %
PATIENT TEMP: ABNORMAL
PCO2, VEN: 94 MM HG (ref 39–55)
PDW BLD-RTO: 15.7 % (ref 11.5–14.5)
PH VENOUS: 7.21 (ref 7.32–7.42)
PLATELET # BLD: 219 K/UL (ref 130–400)
PLATELET ESTIMATE: ABNORMAL
PMV BLD AUTO: 8.7 FL (ref 6–12)
PO2, VEN: 148 MM HG (ref 30–50)
POC PCO2 TEMP: ABNORMAL MM HG
POC PH TEMP: ABNORMAL
POC PO2 TEMP: ABNORMAL MM HG
POSITIVE BASE EXCESS, VEN: 9 (ref 0–2)
POTASSIUM SERPL-SCNC: 4.6 MMOL/L (ref 3.7–5.3)
PRO-BNP: 2211 PG/ML
RBC # BLD: 4.8 M/UL (ref 4–5.2)
RBC # BLD: ABNORMAL 10*6/UL
SEG NEUTROPHILS: 87 % (ref 36–66)
SEGMENTED NEUTROPHILS ABSOLUTE COUNT: 6.5 K/UL (ref 1.8–7.7)
SODIUM BLD-SCNC: 148 MMOL/L (ref 135–144)
THYROXINE, FREE: 1.23 NG/DL (ref 0.93–1.7)
TOTAL CO2, VENOUS: 40 MMOL/L (ref 25–31)
TSH SERPL DL<=0.05 MIU/L-ACNC: 4.23 MIU/L (ref 0.3–5)
WBC # BLD: 7.4 K/UL (ref 3.5–11)
WBC # BLD: ABNORMAL 10*3/UL

## 2018-01-18 PROCEDURE — 83880 ASSAY OF NATRIURETIC PEPTIDE: CPT

## 2018-01-18 PROCEDURE — 2580000003 HC RX 258: Performed by: EMERGENCY MEDICINE

## 2018-01-18 PROCEDURE — 83605 ASSAY OF LACTIC ACID: CPT

## 2018-01-18 PROCEDURE — 87070 CULTURE OTHR SPECIMN AEROBIC: CPT

## 2018-01-18 PROCEDURE — 6360000002 HC RX W HCPCS: Performed by: INTERNAL MEDICINE

## 2018-01-18 PROCEDURE — 99285 EMERGENCY DEPT VISIT HI MDM: CPT

## 2018-01-18 PROCEDURE — 6370000000 HC RX 637 (ALT 250 FOR IP): Performed by: EMERGENCY MEDICINE

## 2018-01-18 PROCEDURE — 71045 X-RAY EXAM CHEST 1 VIEW: CPT

## 2018-01-18 PROCEDURE — 82330 ASSAY OF CALCIUM: CPT

## 2018-01-18 PROCEDURE — 82803 BLOOD GASES ANY COMBINATION: CPT

## 2018-01-18 PROCEDURE — 84439 ASSAY OF FREE THYROXINE: CPT

## 2018-01-18 PROCEDURE — 82947 ASSAY GLUCOSE BLOOD QUANT: CPT

## 2018-01-18 PROCEDURE — 84443 ASSAY THYROID STIM HORMONE: CPT

## 2018-01-18 PROCEDURE — 87205 SMEAR GRAM STAIN: CPT

## 2018-01-18 PROCEDURE — 1200000000 HC SEMI PRIVATE

## 2018-01-18 PROCEDURE — 6360000002 HC RX W HCPCS: Performed by: EMERGENCY MEDICINE

## 2018-01-18 PROCEDURE — 80048 BASIC METABOLIC PNL TOTAL CA: CPT

## 2018-01-18 PROCEDURE — 87040 BLOOD CULTURE FOR BACTERIA: CPT

## 2018-01-18 PROCEDURE — 83735 ASSAY OF MAGNESIUM: CPT

## 2018-01-18 PROCEDURE — 94640 AIRWAY INHALATION TREATMENT: CPT

## 2018-01-18 PROCEDURE — 6370000000 HC RX 637 (ALT 250 FOR IP): Performed by: INTERNAL MEDICINE

## 2018-01-18 PROCEDURE — 93005 ELECTROCARDIOGRAM TRACING: CPT

## 2018-01-18 PROCEDURE — 85025 COMPLETE CBC W/AUTO DIFF WBC: CPT

## 2018-01-18 PROCEDURE — 2580000003 HC RX 258: Performed by: INTERNAL MEDICINE

## 2018-01-18 RX ORDER — ASPIRIN 81 MG/1
81 TABLET, CHEWABLE ORAL
Status: DISCONTINUED | OUTPATIENT
Start: 2018-01-19 | End: 2018-01-20 | Stop reason: HOSPADM

## 2018-01-18 RX ORDER — DEXTROSE MONOHYDRATE 50 MG/ML
INJECTION, SOLUTION INTRAVENOUS CONTINUOUS
Status: DISCONTINUED | OUTPATIENT
Start: 2018-01-18 | End: 2018-01-20 | Stop reason: HOSPADM

## 2018-01-18 RX ORDER — ALBUTEROL SULFATE 90 UG/1
2 AEROSOL, METERED RESPIRATORY (INHALATION)
Status: DISCONTINUED | OUTPATIENT
Start: 2018-01-18 | End: 2018-01-18

## 2018-01-18 RX ORDER — ALBUTEROL SULFATE 2.5 MG/3ML
5 SOLUTION RESPIRATORY (INHALATION)
Status: DISCONTINUED | OUTPATIENT
Start: 2018-01-18 | End: 2018-01-18

## 2018-01-18 RX ORDER — METHYLPREDNISOLONE SODIUM SUCCINATE 125 MG/2ML
125 INJECTION, POWDER, LYOPHILIZED, FOR SOLUTION INTRAMUSCULAR; INTRAVENOUS ONCE
Status: COMPLETED | OUTPATIENT
Start: 2018-01-18 | End: 2018-01-18

## 2018-01-18 RX ORDER — 0.9 % SODIUM CHLORIDE 0.9 %
500 INTRAVENOUS SOLUTION INTRAVENOUS ONCE
Status: COMPLETED | OUTPATIENT
Start: 2018-01-18 | End: 2018-01-18

## 2018-01-18 RX ORDER — LEVOFLOXACIN 5 MG/ML
500 INJECTION, SOLUTION INTRAVENOUS EVERY 24 HOURS
Status: DISCONTINUED | OUTPATIENT
Start: 2018-01-18 | End: 2018-01-19

## 2018-01-18 RX ORDER — ALBUTEROL SULFATE 2.5 MG/3ML
2.5 SOLUTION RESPIRATORY (INHALATION)
Status: DISCONTINUED | OUTPATIENT
Start: 2018-01-18 | End: 2018-01-18

## 2018-01-18 RX ORDER — FUROSEMIDE 20 MG/1
20 TABLET ORAL DAILY
Status: ON HOLD | COMMUNITY
End: 2018-01-19 | Stop reason: HOSPADM

## 2018-01-18 RX ORDER — BRIMONIDINE TARTRATE 2 MG/ML
1 SOLUTION/ DROPS OPHTHALMIC 2 TIMES DAILY
Status: DISCONTINUED | OUTPATIENT
Start: 2018-01-18 | End: 2018-01-20 | Stop reason: HOSPADM

## 2018-01-18 RX ORDER — IPRATROPIUM BROMIDE AND ALBUTEROL SULFATE 2.5; .5 MG/3ML; MG/3ML
1 SOLUTION RESPIRATORY (INHALATION)
Status: DISCONTINUED | OUTPATIENT
Start: 2018-01-18 | End: 2018-01-18

## 2018-01-18 RX ORDER — METHYLPREDNISOLONE SODIUM SUCCINATE 40 MG/ML
40 INJECTION, POWDER, LYOPHILIZED, FOR SOLUTION INTRAMUSCULAR; INTRAVENOUS EVERY 6 HOURS
Status: DISCONTINUED | OUTPATIENT
Start: 2018-01-18 | End: 2018-01-19

## 2018-01-18 RX ORDER — POTASSIUM CHLORIDE 750 MG/1
10 CAPSULE, EXTENDED RELEASE ORAL DAILY
Status: ON HOLD | COMMUNITY
End: 2018-01-19 | Stop reason: HOSPADM

## 2018-01-18 RX ORDER — CALCIUM CARBONATE/VITAMIN D3 600 MG-10
1 TABLET ORAL DAILY
Status: DISCONTINUED | OUTPATIENT
Start: 2018-01-19 | End: 2018-01-20 | Stop reason: HOSPADM

## 2018-01-18 RX ADMIN — METHYLPREDNISOLONE SODIUM SUCCINATE 125 MG: 125 INJECTION, POWDER, FOR SOLUTION INTRAMUSCULAR; INTRAVENOUS at 15:46

## 2018-01-18 RX ADMIN — SODIUM CHLORIDE 500 ML: 0.9 INJECTION, SOLUTION INTRAVENOUS at 13:35

## 2018-01-18 RX ADMIN — METHYLPREDNISOLONE SODIUM SUCCINATE 40 MG: 40 INJECTION, POWDER, FOR SOLUTION INTRAMUSCULAR; INTRAVENOUS at 22:50

## 2018-01-18 RX ADMIN — LEVOFLOXACIN 500 MG: 5 INJECTION, SOLUTION INTRAVENOUS at 19:52

## 2018-01-18 RX ADMIN — Medication 1 UNITS: at 22:48

## 2018-01-18 RX ADMIN — IPRATROPIUM BROMIDE AND ALBUTEROL SULFATE 1 AMPULE: .5; 3 SOLUTION RESPIRATORY (INHALATION) at 16:10

## 2018-01-18 RX ADMIN — DEXTROSE MONOHYDRATE 50 ML/HR: 50 INJECTION, SOLUTION INTRAVENOUS at 19:23

## 2018-01-19 LAB
ABSOLUTE EOS #: 0 K/UL (ref 0–0.4)
ABSOLUTE IMMATURE GRANULOCYTE: ABNORMAL K/UL (ref 0–0.3)
ABSOLUTE LYMPH #: 0.14 K/UL (ref 1–4.8)
ABSOLUTE MONO #: 0.07 K/UL (ref 0.2–0.8)
ANION GAP SERPL CALCULATED.3IONS-SCNC: 12 MMOL/L (ref 9–17)
BASOPHILS # BLD: 0 %
BASOPHILS ABSOLUTE: 0 K/UL (ref 0–0.2)
BUN BLDV-MCNC: 64 MG/DL (ref 8–23)
BUN/CREAT BLD: 40 (ref 9–20)
CALCIUM SERPL-MCNC: 7.6 MG/DL (ref 8.6–10.4)
CHLORIDE BLD-SCNC: 103 MMOL/L (ref 98–107)
CO2: 26 MMOL/L (ref 20–31)
CREAT SERPL-MCNC: 1.62 MG/DL (ref 0.5–0.9)
DIFFERENTIAL TYPE: ABNORMAL
EOSINOPHILS RELATIVE PERCENT: 0 % (ref 1–4)
FIO2: ABNORMAL
GFR AFRICAN AMERICAN: 36 ML/MIN
GFR NON-AFRICAN AMERICAN: 30 ML/MIN
GFR SERPL CREATININE-BSD FRML MDRD: ABNORMAL ML/MIN/{1.73_M2}
GFR SERPL CREATININE-BSD FRML MDRD: ABNORMAL ML/MIN/{1.73_M2}
GLUCOSE BLD-MCNC: 139 MG/DL (ref 65–105)
GLUCOSE BLD-MCNC: 181 MG/DL (ref 70–99)
HCO3 VENOUS: 35.7 MMOL/L (ref 24–30)
HCT VFR BLD CALC: 41.7 % (ref 36–46)
HEMOGLOBIN: 13 G/DL (ref 12–16)
IMMATURE GRANULOCYTES: ABNORMAL %
LYMPHOCYTES # BLD: 2 % (ref 24–44)
MCH RBC QN AUTO: 31.4 PG (ref 26–34)
MCHC RBC AUTO-ENTMCNC: 31.2 G/DL (ref 31–37)
MCV RBC AUTO: 100.9 FL (ref 80–100)
MONOCYTES # BLD: 1 % (ref 1–7)
NEGATIVE BASE EXCESS, VEN: ABNORMAL (ref 0–2)
NRBC AUTOMATED: ABNORMAL PER 100 WBC
O2 DEVICE/FLOW/%: ABNORMAL
O2 SAT, VEN: 66 %
PATIENT TEMP: ABNORMAL
PCO2, VEN: 120 MM HG (ref 39–55)
PDW BLD-RTO: 15.6 % (ref 11.5–14.5)
PH VENOUS: 7.08 (ref 7.32–7.42)
PLATELET # BLD: 169 K/UL (ref 130–400)
PLATELET ESTIMATE: ABNORMAL
PMV BLD AUTO: 8.2 FL (ref 6–12)
PO2, VEN: 51 MM HG (ref 30–50)
POC PCO2 TEMP: ABNORMAL MM HG
POC PH TEMP: ABNORMAL
POC PO2 TEMP: ABNORMAL MM HG
POSITIVE BASE EXCESS, VEN: 6 (ref 0–2)
POTASSIUM SERPL-SCNC: 5.4 MMOL/L (ref 3.7–5.3)
RBC # BLD: 4.13 M/UL (ref 4–5.2)
RBC # BLD: ABNORMAL 10*6/UL
SEG NEUTROPHILS: 97 % (ref 36–66)
SEGMENTED NEUTROPHILS ABSOLUTE COUNT: 6.89 K/UL (ref 1.8–7.7)
SODIUM BLD-SCNC: 141 MMOL/L (ref 135–144)
TOTAL CO2, VENOUS: 39 MMOL/L (ref 25–31)
WBC # BLD: 7.1 K/UL (ref 3.5–11)
WBC # BLD: ABNORMAL 10*3/UL

## 2018-01-19 PROCEDURE — 2580000003 HC RX 258: Performed by: INTERNAL MEDICINE

## 2018-01-19 PROCEDURE — 6370000000 HC RX 637 (ALT 250 FOR IP): Performed by: INTERNAL MEDICINE

## 2018-01-19 PROCEDURE — 82947 ASSAY GLUCOSE BLOOD QUANT: CPT

## 2018-01-19 PROCEDURE — 6360000002 HC RX W HCPCS: Performed by: INTERNAL MEDICINE

## 2018-01-19 PROCEDURE — 1200000000 HC SEMI PRIVATE

## 2018-01-19 PROCEDURE — 94660 CPAP INITIATION&MGMT: CPT

## 2018-01-19 PROCEDURE — 85025 COMPLETE CBC W/AUTO DIFF WBC: CPT

## 2018-01-19 PROCEDURE — 6360000002 HC RX W HCPCS: Performed by: NURSE PRACTITIONER

## 2018-01-19 PROCEDURE — 94640 AIRWAY INHALATION TREATMENT: CPT

## 2018-01-19 PROCEDURE — 82803 BLOOD GASES ANY COMBINATION: CPT

## 2018-01-19 PROCEDURE — 2700000000 HC OXYGEN THERAPY PER DAY

## 2018-01-19 PROCEDURE — 94761 N-INVAS EAR/PLS OXIMETRY MLT: CPT

## 2018-01-19 PROCEDURE — 2580000003 HC RX 258: Performed by: EMERGENCY MEDICINE

## 2018-01-19 PROCEDURE — 36415 COLL VENOUS BLD VENIPUNCTURE: CPT

## 2018-01-19 PROCEDURE — 80048 BASIC METABOLIC PNL TOTAL CA: CPT

## 2018-01-19 RX ORDER — ONDANSETRON 2 MG/ML
4 INJECTION INTRAMUSCULAR; INTRAVENOUS ONCE
Status: COMPLETED | OUTPATIENT
Start: 2018-01-19 | End: 2018-01-19

## 2018-01-19 RX ORDER — IPRATROPIUM BROMIDE AND ALBUTEROL SULFATE 2.5; .5 MG/3ML; MG/3ML
1 SOLUTION RESPIRATORY (INHALATION)
Status: DISCONTINUED | OUTPATIENT
Start: 2018-01-19 | End: 2018-01-20 | Stop reason: HOSPADM

## 2018-01-19 RX ORDER — MORPHINE SULFATE 2 MG/ML
2 INJECTION, SOLUTION INTRAMUSCULAR; INTRAVENOUS
Status: DISCONTINUED | OUTPATIENT
Start: 2018-01-19 | End: 2018-01-20 | Stop reason: HOSPADM

## 2018-01-19 RX ORDER — DEXTROSE MONOHYDRATE 25 G/50ML
12.5 INJECTION, SOLUTION INTRAVENOUS PRN
Status: DISCONTINUED | OUTPATIENT
Start: 2018-01-19 | End: 2018-01-20 | Stop reason: HOSPADM

## 2018-01-19 RX ORDER — LEVOFLOXACIN 5 MG/ML
250 INJECTION, SOLUTION INTRAVENOUS
Status: DISCONTINUED | OUTPATIENT
Start: 2018-01-20 | End: 2018-01-20 | Stop reason: HOSPADM

## 2018-01-19 RX ORDER — DEXTROSE MONOHYDRATE 50 MG/ML
100 INJECTION, SOLUTION INTRAVENOUS PRN
Status: DISCONTINUED | OUTPATIENT
Start: 2018-01-19 | End: 2018-01-20 | Stop reason: HOSPADM

## 2018-01-19 RX ORDER — NICOTINE POLACRILEX 4 MG
15 LOZENGE BUCCAL PRN
Status: DISCONTINUED | OUTPATIENT
Start: 2018-01-19 | End: 2018-01-20 | Stop reason: HOSPADM

## 2018-01-19 RX ORDER — 0.9 % SODIUM CHLORIDE 0.9 %
500 INTRAVENOUS SOLUTION INTRAVENOUS ONCE
Status: COMPLETED | OUTPATIENT
Start: 2018-01-19 | End: 2018-01-19

## 2018-01-19 RX ADMIN — IPRATROPIUM BROMIDE AND ALBUTEROL SULFATE 1 AMPULE: .5; 3 SOLUTION RESPIRATORY (INHALATION) at 07:45

## 2018-01-19 RX ADMIN — DEXTROSE MONOHYDRATE: 50 INJECTION, SOLUTION INTRAVENOUS at 23:02

## 2018-01-19 RX ADMIN — IPRATROPIUM BROMIDE AND ALBUTEROL SULFATE 1 AMPULE: .5; 3 SOLUTION RESPIRATORY (INHALATION) at 21:07

## 2018-01-19 RX ADMIN — ONDANSETRON 4 MG: 2 INJECTION INTRAMUSCULAR; INTRAVENOUS at 16:58

## 2018-01-19 RX ADMIN — DEXTROSE MONOHYDRATE: 50 INJECTION, SOLUTION INTRAVENOUS at 13:10

## 2018-01-19 RX ADMIN — METHYLPREDNISOLONE SODIUM SUCCINATE 40 MG: 40 INJECTION, POWDER, FOR SOLUTION INTRAMUSCULAR; INTRAVENOUS at 06:37

## 2018-01-19 RX ADMIN — IPRATROPIUM BROMIDE AND ALBUTEROL SULFATE 1 AMPULE: .5; 3 SOLUTION RESPIRATORY (INHALATION) at 10:48

## 2018-01-19 RX ADMIN — MORPHINE SULFATE 2 MG: 2 INJECTION, SOLUTION INTRAMUSCULAR; INTRAVENOUS at 23:40

## 2018-01-19 RX ADMIN — SODIUM CHLORIDE 500 ML: 9 INJECTION, SOLUTION INTRAVENOUS at 00:17

## 2018-01-19 RX ADMIN — MORPHINE SULFATE 2 MG: 2 INJECTION, SOLUTION INTRAMUSCULAR; INTRAVENOUS at 13:10

## 2018-01-19 ASSESSMENT — PAIN SCALES - WONG BAKER
WONGBAKER_NUMERICALRESPONSE: 2
WONGBAKER_NUMERICALRESPONSE: 0

## 2018-01-19 ASSESSMENT — PAIN SCALES - GENERAL
PAINLEVEL_OUTOF10: 5
PAINLEVEL_OUTOF10: 0

## 2018-01-19 ASSESSMENT — PAIN DESCRIPTION - DESCRIPTORS: DESCRIPTORS: DISCOMFORT

## 2018-01-19 NOTE — CONSULTS
Intravenous Q24H     glucose, dextrose, glucagon (rDNA), dextrose  IV Drips/Infusions   dextrose      dextrose 50 mL/hr (01/18/18 1923)     Home Medications  Prescriptions Prior to Admission: furosemide (LASIX) 20 MG tablet, Take 20 mg by mouth daily  potassium chloride (MICRO-K) 10 MEQ extended release capsule, Take 10 mEq by mouth daily  fluticasone-salmeterol (ADVAIR) 250-50 MCG/DOSE AEPB, Inhale 1 puff into the lungs every 12 hours  ipratropium-albuterol (DUONEB) 0.5-2.5 (3) MG/3ML SOLN nebulizer solution, Inhale 3 mLs into the lungs every 4 hours (while awake)  lisinopril (PRINIVIL;ZESTRIL) 20 MG tablet, Take 40 mg by mouth daily   brimonidine (ALPHAGAN P) 0.1 % SOLN, Place 1 drop into both eyes 2 times daily  Multiple Vitamins-Minerals (THERAPEUTIC MULTIVITAMIN-MINERALS) tablet, Take 1 tablet by mouth daily  simvastatin (ZOCOR) 40 MG tablet, Take 40 mg by mouth nightly. metoprolol (LOPRESSOR) 25 MG tablet, Take 25 mg by mouth 2 times daily. Calcium Carbonate-Vitamin D (CALCIUM + D) 600-200 MG-UNIT TABS, Take  by mouth daily. aspirin 81 MG tablet, Take 81 mg by mouth three times a week   albuterol (ACCUNEB) 0.63 MG/3ML nebulizer solution, Take 1 ampule by nebulization every 6 hours as needed. Allergies    Augmentin [amoxicillin-pot clavulanate]  Social History     Social History     Social History    Marital status:      Spouse name: N/A    Number of children: N/A    Years of education: N/A     Occupational History    Not on file. Social History Main Topics    Smoking status: Former Smoker    Smokeless tobacco: Never Used      Comment: years ago    Alcohol use No    Drug use: No    Sexual activity: Not on file     Other Topics Concern    Not on file     Social History Narrative    ** Merged History Encounter **          Family History    History reviewed. No pertinent family history.   ROS - 11 systems   Unable to do a detailed review of system secondary to patient's mental ALKPHOS 68 10/11/2017     10/11/2017    AST 81 10/11/2017    PROT 5.5 10/11/2017    BILITOT 0.28 10/11/2017    BILIDIR <0.08 10/11/2017    IBILI CANNOT BE CALCULATED 10/11/2017    LABALBU 3.1 10/11/2017       Radiology    CXR       (See actual reports for details)    \"Thank you for asking us to see this patient\"    Case discussed with nurse and patient/family. Questions and concerns addressed. Electronically signed by     Tony Rea CNP on 1/19/2018 at 11:39 AM  Patient was seen under the supervision of Dr. Valerio Tomlinson and Sleep Medicine,    Patient seen and evaluated by me. 80years old white female with a history of COPD, hypertension, hyperthyroidism, CAD admitted for generalized weakness and lower estimates cellulitis/edema. She was started on BiPAP for her COPD flareup and shortness of breath. Patient's family decided to make her comfort care only. On my arrival patient is looking comfortable in no apparent distress. No wheezing noted. Plan is to continue comfort care per family request.  Above was reviewed and discussed with Caleb Moser CNP. And I agree with assessment and plan of care.   Electronically signed by     Holland Segovia MD on 1/19/2018 at 2:09 PM  Pulmonary Critical Care and Sleep Medicine,  [facility]  Cell: [phone]  Office: [phone]

## 2018-01-19 NOTE — H&P
Department of Internal Medicine  Attending History and Physical        CHIEF COMPLAINT:  Shortness of breath    History Obtained From: Patient/Daughter    HISTORY OF PRESENT ILLNESS:    80year old female with COPD seen in the office for progressively worsening shortness of breath for the last several days. No chills/fever. No productive cough/pleuritic chest pains. Added data of poor oral food/fluid intake. No other information in as far as present illness in concerned is available from patient/daughter. Co-morbidities are outlined under past history. Daughter was advised to take patient to the ER. Pt subsequently admitted for further management and evaluation. Past Medical/Surgical History:  Past Medical History:   Diagnosis Date    CAD (coronary artery disease)     Cancer [facility] [address]     Colon polyp     COPD (chronic obstructive pulmonary disease) ([facility] [address]     Hypertension     Hyperthyroidism     Pelvic fracture (HCC)     Unspecified cerebral artery occlusion with cerebral infarction          Past Surgical History:   Procedure Laterality Date    BREAST SURGERY      COLONOSCOPY      CORONARY ANGIOPLASTY WITH STENT PLACEMENT  2010    HYSTERECTOMY      JOINT REPLACEMENT      THYROIDECTOMY N/A     partial     VASCULAR SURGERY         No current facility-administered medications on file prior to encounter. Current Outpatient Prescriptions on File Prior to Encounter   Medication Sig Dispense Refill    ipratropium-albuterol (DUONEB) 0.5-2.5 (3) MG/3ML SOLN nebulizer solution Inhale 3 mLs into the lungs every 4 hours (while awake) 360 mL 3    lisinopril (PRINIVIL;ZESTRIL) 20 MG tablet Take 40 mg by mouth daily       brimonidine (ALPHAGAN P) 0.1 % SOLN Place 1 drop into both eyes 2 times daily      Multiple Vitamins-Minerals (THERAPEUTIC MULTIVITAMIN-MINERALS) tablet Take 1 tablet by mouth daily      simvastatin (ZOCOR) 40 MG tablet Take 40 mg by mouth nightly.       metoprolol (LOPRESSOR) 25 MG tablet

## 2018-01-19 NOTE — PLAN OF CARE
Problem: Risk for Impaired Skin Integrity  Goal: Tissue integrity - skin and mucous membranes  Structural intactness and normal physiological function of skin and  mucous membranes. Outcome: Ongoing  Patient has a skin tear on the left lower leg. Non-stick dressing and krilex in place. Heels are red. mepliex applied to reduce friction. Patient has a pressure injury on coccyx.     Problem: Infection:  Goal: Will remain free from infection  Will remain free from infection   Outcome: Ongoing      Problem: Pain:  Goal: Patient's pain/discomfort is manageable  Patient's pain/discomfort is manageable   Outcome: Ongoing

## 2018-01-19 NOTE — PROGRESS NOTES
Patient admitted to room 2025 from ER  Family was oriented to room, call light and bed mechanics. Siderails up x2/4. Call light and overbed table within reach. Patient not oriented, unable to verbalize english. Admission completed with patient's daughter.
secretions []  Moderate amount of viscous secretions []  Copius, Viscious Yellow/ Secretions   CXR as reported by physician []  clear  []  Unavailable []  Infiltrates and/or consolidation  []  Unavailable []  Mucus Plugging and or lobar consolidation  []  Unavailable   Cough []  Strong, productive cough []  Weak productive cough []  No cough or weak non-productive cough

## 2018-01-19 NOTE — FLOWSHEET NOTE
01/19/18 1647   Provider Notification   Reason for Communication New orders   Provider Name Dr. Cheli Crawford   Provider Notification Physician   Method of Communication Call   Response See orders   Notification Time [id number]     Notified Dr. Cheli Crafword that the patient is awake and nauseous. Requested order for zofran. See orders.

## 2018-01-19 NOTE — DISCHARGE SUMMARY
Discharge Summary    Patient ID: Joan Pérez    MRN: [id number]     Acct:  [de-identified]       Patient's PCP: Liliya Reeves MD    Admit Date: 1/18/2018     Discharge Date: 1/19/2018      Admitting Physician: Trinidad Carreon MD    Discharge Physician: Liliya Reeves MD     Discharge Diagnoses:  COPD with acute exacerbation  Acute on chronic respiratory failure with hypoxia and hypercapnia  Acute kidney injury (nontraumatic)  Dehydration with hypernatremia  Severe protein-calorie malnutrition    Primary Problem  Chronic obstructive pulmonary disease with acute exacerbation [facility])    Active Hospital Problems    Diagnosis Date Noted    Chronic obstructive pulmonary disease with acute exacerbation ([facility] [address] [J44.1] 01/18/2018    Acute on chronic respiratory failure with hypoxia and hypercapnia (HCC) [A78.29, J96.22] 01/18/2018    Acute kidney injury (nontraumatic) ([address] [N17.9] 01/18/2018    Dehydration with hypernatremia [E87.0] 01/18/2018    Severe protein-calorie malnutrition ([facility] [address] [E43] 01/18/2018    COPD exacerbation ([facility] [address] [J44.1] 12/22/2014     Past Medical History:   Diagnosis Date    CAD (coronary artery disease)     Cancer ([facility] [address]     Colon polyp     COPD (chronic obstructive pulmonary disease) ([address]     Hypertension     Hyperthyroidism     Pelvic fracture ([facility] [address]     Unspecified cerebral artery occlusion with cerebral infarction      The patient was seen and examined on day of discharge and this discharge summary is in conjunction with any daily progress note from day of discharge. Code Status:  [place] [facility] Course: conditioned worsened. Family requested [facility] status and Hospice Care. Consults:  pulmonary/intensive care    Significant Diagnostic Studies: see EHR. Treatments: medical.    Disposition: Hospice Care ([facility])    Discharged Condition: prognosis poor. Follow Up:  [facility] to assume care. Discharge Medications:   Pt's home meds.

## 2018-01-19 NOTE — ED NOTES
pts blood pressure is 67/31 heart rate 97 pt is drowsy but arousable Dr. Holden London paged     Estrada Alanis RN  01/18/18 0345

## 2018-01-20 VITALS
HEART RATE: 82 BPM | WEIGHT: 83.6 LBS | RESPIRATION RATE: 16 BRPM | BODY MASS INDEX: 15.38 KG/M2 | DIASTOLIC BLOOD PRESSURE: 35 MMHG | TEMPERATURE: 97.7 F | OXYGEN SATURATION: 93 % | SYSTOLIC BLOOD PRESSURE: 66 MMHG | HEIGHT: 62 IN

## 2018-01-20 NOTE — PLAN OF CARE
Problem: Infection:  Goal: Will remain free from infection  Will remain free from infection   Outcome: Ongoing      Problem: Safety:  Goal: Free from accidental physical injury  Free from accidental physical injury   Outcome: Ongoing      Problem: Pain:  Goal: Patient's pain/discomfort is manageable  Patient's pain/discomfort is manageable   Outcome: Ongoing      Problem: Skin Integrity:  Goal: Skin integrity will stabilize  Skin integrity will stabilize  Outcome: Ongoing      Problem: Discharge Planning:  Goal: Patients continuum of care needs are met  Patients continuum of care needs are met  Outcome: Ongoing

## 2018-01-24 LAB
CULTURE: NORMAL
CULTURE: NORMAL
Lab: NORMAL
SPECIMEN DESCRIPTION: NORMAL
SPECIMEN DESCRIPTION: NORMAL
STATUS: NORMAL

## 2018-04-12 PROBLEM — R77.8 ELEVATED TROPONIN: Status: RESOLVED | Noted: 2017-10-08 | Resolved: 2018-04-12
